# Patient Record
Sex: MALE | Race: WHITE | NOT HISPANIC OR LATINO | Employment: OTHER | URBAN - METROPOLITAN AREA
[De-identification: names, ages, dates, MRNs, and addresses within clinical notes are randomized per-mention and may not be internally consistent; named-entity substitution may affect disease eponyms.]

---

## 2017-10-20 ENCOUNTER — HOSPITAL ENCOUNTER (OUTPATIENT)
Dept: RADIOLOGY | Facility: MEDICAL CENTER | Age: 57
End: 2017-10-20

## 2017-10-20 ENCOUNTER — HOSPITAL ENCOUNTER (INPATIENT)
Facility: MEDICAL CENTER | Age: 57
LOS: 4 days | DRG: 291 | End: 2017-10-24
Attending: EMERGENCY MEDICINE | Admitting: HOSPITALIST
Payer: COMMERCIAL

## 2017-10-20 ENCOUNTER — APPOINTMENT (OUTPATIENT)
Dept: RADIOLOGY | Facility: MEDICAL CENTER | Age: 57
DRG: 291 | End: 2017-10-20
Attending: EMERGENCY MEDICINE
Payer: COMMERCIAL

## 2017-10-20 ENCOUNTER — RESOLUTE PROFESSIONAL BILLING HOSPITAL PROF FEE (OUTPATIENT)
Dept: HOSPITALIST | Facility: MEDICAL CENTER | Age: 57
End: 2017-10-20
Payer: COMMERCIAL

## 2017-10-20 DIAGNOSIS — R79.89 ELEVATED TROPONIN: ICD-10-CM

## 2017-10-20 DIAGNOSIS — R06.02 SHORTNESS OF BREATH: ICD-10-CM

## 2017-10-20 DIAGNOSIS — E87.6 HYPOKALEMIA: ICD-10-CM

## 2017-10-20 PROBLEM — I10 ESSENTIAL HYPERTENSION: Status: ACTIVE | Noted: 2017-10-20

## 2017-10-20 PROBLEM — E43 SEVERE PROTEIN-CALORIE MALNUTRITION (HCC): Status: ACTIVE | Noted: 2017-10-20

## 2017-10-20 PROBLEM — J44.1 COPD EXACERBATION (HCC): Status: ACTIVE | Noted: 2017-10-20

## 2017-10-20 PROBLEM — J96.21 ACUTE ON CHRONIC RESPIRATORY FAILURE WITH HYPOXIA (HCC): Status: ACTIVE | Noted: 2017-10-20

## 2017-10-20 PROBLEM — D64.9 NORMOCYTIC ANEMIA: Status: ACTIVE | Noted: 2017-10-20

## 2017-10-20 PROBLEM — E87.29 HIGH ANION GAP METABOLIC ACIDOSIS: Status: ACTIVE | Noted: 2017-10-20

## 2017-10-20 PROBLEM — R64 CACHEXIA (HCC): Status: ACTIVE | Noted: 2017-10-20

## 2017-10-20 LAB
ALBUMIN SERPL BCP-MCNC: 3.2 G/DL (ref 3.2–4.9)
ALBUMIN/GLOB SERPL: 0.7 G/DL
ALP SERPL-CCNC: 163 U/L (ref 30–99)
ALT SERPL-CCNC: <5 U/L (ref 2–50)
ANION GAP SERPL CALC-SCNC: 16 MMOL/L (ref 0–11.9)
AST SERPL-CCNC: 10 U/L (ref 12–45)
BASE EXCESS BLDA CALC-SCNC: -9 MMOL/L (ref -4–3)
BASOPHILS # BLD AUTO: 0.6 % (ref 0–1.8)
BASOPHILS # BLD: 0.05 K/UL (ref 0–0.12)
BILIRUB SERPL-MCNC: 0.3 MG/DL (ref 0.1–1.5)
BODY TEMPERATURE: ABNORMAL CENTIGRADE
BUN SERPL-MCNC: 11 MG/DL (ref 8–22)
CALCIUM SERPL-MCNC: 8.6 MG/DL (ref 8.5–10.5)
CHLORIDE SERPL-SCNC: 109 MMOL/L (ref 96–112)
CO2 SERPL-SCNC: 17 MMOL/L (ref 20–33)
CREAT SERPL-MCNC: 1.01 MG/DL (ref 0.5–1.4)
EKG IMPRESSION: NORMAL
EOSINOPHIL # BLD AUTO: 0.03 K/UL (ref 0–0.51)
EOSINOPHIL NFR BLD: 0.4 % (ref 0–6.9)
ERYTHROCYTE [DISTWIDTH] IN BLOOD BY AUTOMATED COUNT: 53.2 FL (ref 35.9–50)
EST. AVERAGE GLUCOSE BLD GHB EST-MCNC: 120 MG/DL
GFR SERPL CREATININE-BSD FRML MDRD: >60 ML/MIN/1.73 M 2
GLOBULIN SER CALC-MCNC: 4.4 G/DL (ref 1.9–3.5)
GLUCOSE SERPL-MCNC: 154 MG/DL (ref 65–99)
HBA1C MFR BLD: 5.8 % (ref 0–5.6)
HCO3 BLDA-SCNC: 14 MMOL/L (ref 17–25)
HCT VFR BLD AUTO: 33.9 % (ref 42–52)
HGB BLD-MCNC: 10.5 G/DL (ref 14–18)
IMM GRANULOCYTES # BLD AUTO: 0.09 K/UL (ref 0–0.11)
IMM GRANULOCYTES NFR BLD AUTO: 1.1 % (ref 0–0.9)
LACTATE BLD-SCNC: 2 MMOL/L (ref 0.5–2)
LYMPHOCYTES # BLD AUTO: 1.27 K/UL (ref 1–4.8)
LYMPHOCYTES NFR BLD: 16.2 % (ref 22–41)
MCH RBC QN AUTO: 27.6 PG (ref 27–33)
MCHC RBC AUTO-ENTMCNC: 31 G/DL (ref 33.7–35.3)
MCV RBC AUTO: 89.2 FL (ref 81.4–97.8)
MONOCYTES # BLD AUTO: 0.03 K/UL (ref 0–0.85)
MONOCYTES NFR BLD AUTO: 0.4 % (ref 0–13.4)
NEUTROPHILS # BLD AUTO: 6.38 K/UL (ref 1.82–7.42)
NEUTROPHILS NFR BLD: 81.3 % (ref 44–72)
NRBC # BLD AUTO: 0 K/UL
NRBC BLD AUTO-RTO: 0 /100 WBC
PCO2 BLDA: 23.2 MMHG (ref 26–37)
PH BLDA: 7.4 [PH] (ref 7.4–7.5)
PLATELET # BLD AUTO: 475 K/UL (ref 164–446)
PMV BLD AUTO: 10.5 FL (ref 9–12.9)
PO2 BLDA: 84.8 MMHG (ref 64–87)
POTASSIUM SERPL-SCNC: 3.3 MMOL/L (ref 3.6–5.5)
PROCALCITONIN SERPL-MCNC: 0.1 NG/ML
PROT SERPL-MCNC: 7.6 G/DL (ref 6–8.2)
RBC # BLD AUTO: 3.8 M/UL (ref 4.7–6.1)
SAO2 % BLDA: 95.6 % (ref 93–99)
SODIUM SERPL-SCNC: 142 MMOL/L (ref 135–145)
TROPONIN I SERPL-MCNC: 0.05 NG/ML (ref 0–0.04)
TROPONIN I SERPL-MCNC: 0.06 NG/ML (ref 0–0.04)
TSH SERPL DL<=0.005 MIU/L-ACNC: 0.69 UIU/ML (ref 0.3–3.7)
WBC # BLD AUTO: 7.9 K/UL (ref 4.8–10.8)

## 2017-10-20 PROCEDURE — 99285 EMERGENCY DEPT VISIT HI MDM: CPT

## 2017-10-20 PROCEDURE — 700111 HCHG RX REV CODE 636 W/ 250 OVERRIDE (IP): Performed by: HOSPITALIST

## 2017-10-20 PROCEDURE — 700101 HCHG RX REV CODE 250: Performed by: HOSPITALIST

## 2017-10-20 PROCEDURE — 36415 COLL VENOUS BLD VENIPUNCTURE: CPT

## 2017-10-20 PROCEDURE — 82803 BLOOD GASES ANY COMBINATION: CPT

## 2017-10-20 PROCEDURE — 304561 HCHG STAT O2

## 2017-10-20 PROCEDURE — 83605 ASSAY OF LACTIC ACID: CPT

## 2017-10-20 PROCEDURE — 96372 THER/PROPH/DIAG INJ SC/IM: CPT

## 2017-10-20 PROCEDURE — 99223 1ST HOSP IP/OBS HIGH 75: CPT | Performed by: HOSPITALIST

## 2017-10-20 PROCEDURE — 96365 THER/PROPH/DIAG IV INF INIT: CPT

## 2017-10-20 PROCEDURE — 700102 HCHG RX REV CODE 250 W/ 637 OVERRIDE(OP): Performed by: EMERGENCY MEDICINE

## 2017-10-20 PROCEDURE — 80053 COMPREHEN METABOLIC PANEL: CPT

## 2017-10-20 PROCEDURE — 85025 COMPLETE CBC W/AUTO DIFF WBC: CPT

## 2017-10-20 PROCEDURE — 700102 HCHG RX REV CODE 250 W/ 637 OVERRIDE(OP): Performed by: HOSPITALIST

## 2017-10-20 PROCEDURE — A9270 NON-COVERED ITEM OR SERVICE: HCPCS | Performed by: HOSPITALIST

## 2017-10-20 PROCEDURE — 94640 AIRWAY INHALATION TREATMENT: CPT

## 2017-10-20 PROCEDURE — 84145 PROCALCITONIN (PCT): CPT

## 2017-10-20 PROCEDURE — 84443 ASSAY THYROID STIM HORMONE: CPT

## 2017-10-20 PROCEDURE — 770020 HCHG ROOM/CARE - TELE (206)

## 2017-10-20 PROCEDURE — 71010 DX-CHEST-LIMITED (1 VIEW): CPT

## 2017-10-20 PROCEDURE — 84484 ASSAY OF TROPONIN QUANT: CPT

## 2017-10-20 PROCEDURE — A9270 NON-COVERED ITEM OR SERVICE: HCPCS | Performed by: EMERGENCY MEDICINE

## 2017-10-20 PROCEDURE — 83036 HEMOGLOBIN GLYCOSYLATED A1C: CPT

## 2017-10-20 PROCEDURE — 93005 ELECTROCARDIOGRAM TRACING: CPT | Performed by: HOSPITALIST

## 2017-10-20 PROCEDURE — 96375 TX/PRO/DX INJ NEW DRUG ADDON: CPT

## 2017-10-20 PROCEDURE — 93010 ELECTROCARDIOGRAM REPORT: CPT | Performed by: INTERNAL MEDICINE

## 2017-10-20 RX ORDER — OXYCODONE HYDROCHLORIDE 5 MG/1
2.5 TABLET ORAL
Status: DISCONTINUED | OUTPATIENT
Start: 2017-10-20 | End: 2017-10-24 | Stop reason: HOSPADM

## 2017-10-20 RX ORDER — AMOXICILLIN 250 MG
2 CAPSULE ORAL 2 TIMES DAILY
Status: DISCONTINUED | OUTPATIENT
Start: 2017-10-20 | End: 2017-10-24 | Stop reason: HOSPADM

## 2017-10-20 RX ORDER — POTASSIUM CHLORIDE 7.45 MG/ML
10 INJECTION INTRAVENOUS ONCE
Status: COMPLETED | OUTPATIENT
Start: 2017-10-20 | End: 2017-10-20

## 2017-10-20 RX ORDER — CARVEDILOL 12.5 MG/1
12.5 TABLET ORAL 2 TIMES DAILY WITH MEALS
COMMUNITY

## 2017-10-20 RX ORDER — BISACODYL 10 MG
10 SUPPOSITORY, RECTAL RECTAL
Status: DISCONTINUED | OUTPATIENT
Start: 2017-10-20 | End: 2017-10-24 | Stop reason: HOSPADM

## 2017-10-20 RX ORDER — POLYETHYLENE GLYCOL 3350 17 G/17G
1 POWDER, FOR SOLUTION ORAL
Status: DISCONTINUED | OUTPATIENT
Start: 2017-10-20 | End: 2017-10-24 | Stop reason: HOSPADM

## 2017-10-20 RX ORDER — LISINOPRIL 20 MG/1
20 TABLET ORAL DAILY
COMMUNITY
End: 2017-10-20

## 2017-10-20 RX ORDER — MORPHINE SULFATE 4 MG/ML
2 INJECTION, SOLUTION INTRAMUSCULAR; INTRAVENOUS
Status: DISCONTINUED | OUTPATIENT
Start: 2017-10-20 | End: 2017-10-24 | Stop reason: HOSPADM

## 2017-10-20 RX ORDER — ASPIRIN 325 MG
325 TABLET ORAL ONCE
Status: COMPLETED | OUTPATIENT
Start: 2017-10-20 | End: 2017-10-20

## 2017-10-20 RX ORDER — MIRTAZAPINE 30 MG/1
30 TABLET, FILM COATED ORAL
Status: DISCONTINUED | OUTPATIENT
Start: 2017-10-20 | End: 2017-10-24 | Stop reason: HOSPADM

## 2017-10-20 RX ORDER — ONDANSETRON 4 MG/1
4 TABLET, ORALLY DISINTEGRATING ORAL EVERY 4 HOURS PRN
Status: DISCONTINUED | OUTPATIENT
Start: 2017-10-20 | End: 2017-10-24 | Stop reason: HOSPADM

## 2017-10-20 RX ORDER — CLONIDINE HYDROCHLORIDE 0.1 MG/1
0.1 TABLET ORAL EVERY 6 HOURS PRN
Status: DISCONTINUED | OUTPATIENT
Start: 2017-10-20 | End: 2017-10-24 | Stop reason: HOSPADM

## 2017-10-20 RX ORDER — METHYLPREDNISOLONE SODIUM SUCCINATE 40 MG/ML
40 INJECTION, POWDER, LYOPHILIZED, FOR SOLUTION INTRAMUSCULAR; INTRAVENOUS EVERY 6 HOURS
Status: COMPLETED | OUTPATIENT
Start: 2017-10-20 | End: 2017-10-23

## 2017-10-20 RX ORDER — PROMETHAZINE HYDROCHLORIDE 25 MG/1
12.5-25 SUPPOSITORY RECTAL EVERY 4 HOURS PRN
Status: DISCONTINUED | OUTPATIENT
Start: 2017-10-20 | End: 2017-10-24 | Stop reason: HOSPADM

## 2017-10-20 RX ORDER — ENALAPRILAT 1.25 MG/ML
1.25 INJECTION INTRAVENOUS EVERY 6 HOURS PRN
Status: DISCONTINUED | OUTPATIENT
Start: 2017-10-20 | End: 2017-10-24 | Stop reason: HOSPADM

## 2017-10-20 RX ORDER — DOXYCYCLINE 100 MG/1
100 TABLET ORAL EVERY 12 HOURS
Status: DISCONTINUED | OUTPATIENT
Start: 2017-10-20 | End: 2017-10-24 | Stop reason: HOSPADM

## 2017-10-20 RX ORDER — LISINOPRIL 10 MG/1
20 TABLET ORAL DAILY
COMMUNITY

## 2017-10-20 RX ORDER — ONDANSETRON 2 MG/ML
4 INJECTION INTRAMUSCULAR; INTRAVENOUS EVERY 4 HOURS PRN
Status: DISCONTINUED | OUTPATIENT
Start: 2017-10-20 | End: 2017-10-24 | Stop reason: HOSPADM

## 2017-10-20 RX ORDER — LISINOPRIL 20 MG/1
20 TABLET ORAL DAILY
Status: DISCONTINUED | OUTPATIENT
Start: 2017-10-20 | End: 2017-10-23

## 2017-10-20 RX ORDER — ACETAMINOPHEN 325 MG/1
650 TABLET ORAL EVERY 6 HOURS PRN
Status: DISCONTINUED | OUTPATIENT
Start: 2017-10-20 | End: 2017-10-24 | Stop reason: HOSPADM

## 2017-10-20 RX ORDER — PROMETHAZINE HYDROCHLORIDE 25 MG/1
12.5-25 TABLET ORAL EVERY 4 HOURS PRN
Status: DISCONTINUED | OUTPATIENT
Start: 2017-10-20 | End: 2017-10-24 | Stop reason: HOSPADM

## 2017-10-20 RX ORDER — SODIUM CHLORIDE 9 MG/ML
INJECTION, SOLUTION INTRAVENOUS CONTINUOUS
Status: DISCONTINUED | OUTPATIENT
Start: 2017-10-20 | End: 2017-10-20

## 2017-10-20 RX ORDER — CARVEDILOL 12.5 MG/1
12.5 TABLET ORAL 2 TIMES DAILY WITH MEALS
Status: DISCONTINUED | OUTPATIENT
Start: 2017-10-20 | End: 2017-10-24 | Stop reason: HOSPADM

## 2017-10-20 RX ORDER — OXYCODONE HYDROCHLORIDE 5 MG/1
5 TABLET ORAL
Status: DISCONTINUED | OUTPATIENT
Start: 2017-10-20 | End: 2017-10-24 | Stop reason: HOSPADM

## 2017-10-20 RX ORDER — MIRTAZAPINE 30 MG/1
30 TABLET, FILM COATED ORAL
COMMUNITY

## 2017-10-20 RX ORDER — IPRATROPIUM BROMIDE AND ALBUTEROL SULFATE 2.5; .5 MG/3ML; MG/3ML
3 SOLUTION RESPIRATORY (INHALATION)
Status: DISCONTINUED | OUTPATIENT
Start: 2017-10-20 | End: 2017-10-21

## 2017-10-20 RX ADMIN — METHYLPREDNISOLONE SODIUM SUCCINATE 40 MG: 40 INJECTION, POWDER, FOR SOLUTION INTRAMUSCULAR; INTRAVENOUS at 23:19

## 2017-10-20 RX ADMIN — MIRTAZAPINE 30 MG: 30 TABLET, FILM COATED ORAL at 20:30

## 2017-10-20 RX ADMIN — IPRATROPIUM BROMIDE AND ALBUTEROL SULFATE 3 ML: .5; 3 SOLUTION RESPIRATORY (INHALATION) at 20:05

## 2017-10-20 RX ADMIN — METHYLPREDNISOLONE SODIUM SUCCINATE 40 MG: 40 INJECTION, POWDER, FOR SOLUTION INTRAMUSCULAR; INTRAVENOUS at 15:16

## 2017-10-20 RX ADMIN — CARVEDILOL 12.5 MG: 12.5 TABLET, FILM COATED ORAL at 16:51

## 2017-10-20 RX ADMIN — ASPIRIN 325 MG: 325 TABLET, COATED ORAL at 11:16

## 2017-10-20 RX ADMIN — POTASSIUM CHLORIDE 10 MEQ: 10 INJECTION, SOLUTION INTRAVENOUS at 13:22

## 2017-10-20 RX ADMIN — LISINOPRIL 20 MG: 20 TABLET ORAL at 15:16

## 2017-10-20 RX ADMIN — DOXYCYCLINE 100 MG: 100 TABLET ORAL at 15:16

## 2017-10-20 RX ADMIN — ENOXAPARIN SODIUM 40 MG: 100 INJECTION SUBCUTANEOUS at 16:51

## 2017-10-20 RX ADMIN — IPRATROPIUM BROMIDE AND ALBUTEROL SULFATE 3 ML: .5; 3 SOLUTION RESPIRATORY (INHALATION) at 15:20

## 2017-10-20 ASSESSMENT — PATIENT HEALTH QUESTIONNAIRE - PHQ9
SUM OF ALL RESPONSES TO PHQ QUESTIONS 1-9: 0
SUM OF ALL RESPONSES TO PHQ9 QUESTIONS 1 AND 2: 0
1. LITTLE INTEREST OR PLEASURE IN DOING THINGS: NOT AT ALL
2. FEELING DOWN, DEPRESSED, IRRITABLE, OR HOPELESS: NOT AT ALL

## 2017-10-20 ASSESSMENT — ENCOUNTER SYMPTOMS
BLURRED VISION: 0
SHORTNESS OF BREATH: 0
NEUROLOGICAL NEGATIVE: 1
NAUSEA: 0
MUSCULOSKELETAL NEGATIVE: 1
PSYCHIATRIC NEGATIVE: 1
ABDOMINAL PAIN: 0
DIARRHEA: 0
SPUTUM PRODUCTION: 0
SHORTNESS OF BREATH: 1
DOUBLE VISION: 0
PALPITATIONS: 0
COUGH: 0
HEADACHES: 0
CONSTIPATION: 0
CHILLS: 0
VOMITING: 0
FEVER: 0

## 2017-10-20 ASSESSMENT — PAIN SCALES - GENERAL
PAINLEVEL_OUTOF10: 0

## 2017-10-20 ASSESSMENT — LIFESTYLE VARIABLES
EVER_SMOKED: YES
DO YOU DRINK ALCOHOL: NO

## 2017-10-20 ASSESSMENT — COPD QUESTIONNAIRES
DO YOU EVER COUGH UP ANY MUCUS OR PHLEGM?: NO/ONLY WITH OCCASIONAL COLDS OR INFECTIONS
COPD SCREENING SCORE: 4
DURING THE PAST 4 WEEKS HOW MUCH DID YOU FEEL SHORT OF BREATH: SOME OF THE TIME
HAVE YOU SMOKED AT LEAST 100 CIGARETTES IN YOUR ENTIRE LIFE: YES

## 2017-10-20 NOTE — H&P
Hospital Medicine History and Physical    Date of Service  10/20/2017    Chief Complaint  Chief Complaint   Patient presents with   • Shortness of Breath     Transfer from [place] for early PNA and possible sepsis       History of Presenting Illness  57 y.o. Male With history of essential hypertension, and recent history other this year severe pneumonia, requiring home oxygen therapy was in his usual state of health until approximately one month prior to this admission. He reports at that time, he was told to stop his home oxygen therapy as well as his nebulized therapy, and that he was improving. Approximately 2 weeks prior to admission, he began to feel shortness of breath, associated with weakness. He reports being unable to walk is normal distance, being able to only walk about 10 minutes without becoming weak. He reports that all of these symptoms increased in intensity on the night prior to this admission, at which point he presented to the emergency department in [place]. At that time, initial lab surgery on and he was placed on nebulizer therapy. He did have an elevated lactic acid level, and was treated for sepsis, and subsequently transferred to this facility for further evaluation. He currently states that he is feeling better. He has no significant headache, he does have near blindness in both eyes which is his normal, he denies any chest pain, he still feels a little short of breath, no abdominal pain, nausea or vomiting, diarrhea or constipation. He does not state any fever or chills.       Primary Care Physician  YOLI Gibson M.D.    Consultants  None    Code Status  Full code    Review of Systems  Review of Systems   Constitutional: Negative for chills and fever.   Eyes: Negative for blurred vision and double vision.        Baseline vision loss, no acute changes noted   Respiratory: Negative for cough, sputum production and shortness of breath.    Cardiovascular: Negative for chest pain  and palpitations.   Gastrointestinal: Negative for abdominal pain, constipation, diarrhea, nausea and vomiting.   Genitourinary: Negative for dysuria.   Musculoskeletal: Negative.    Skin: Negative.    Neurological: Negative.  Negative for headaches.   Endo/Heme/Allergies: Negative.    Psychiatric/Behavioral: Negative.         Past Medical History  Past Medical History:   Diagnosis Date   • Alcohol abuse     Pt no longer drinking   • Anxiety    • Chronic obstructive pulmonary disease (CMS-HCC)    • Dental caries    • Hypertension        Surgical History  No past surgical history on file.    Medications  No current facility-administered medications on file prior to encounter.      No current outpatient prescriptions on file prior to encounter.       Family History  History reviewed. No pertinent family history.    Social History  Social History   Substance Use Topics   • Smoking status: Former Smoker     Packs/day: 2.00     Types: Cigarettes     Quit date: 2017   • Smokeless tobacco: Never Used   • Alcohol use No      Comment: Pt quit 2017, former heavy drinker       Allergies  No Known Allergies     Physical Exam  Laboratory   Hemodynamics  Temp (24hrs), Av.8 °C (98.2 °F), Min:36.8 °C (98.2 °F), Max:36.8 °C (98.2 °F)   Temperature: 36.8 °C (98.2 °F)  Pulse  Av.5  Min: 95  Max: 98 Heart Rate (Monitored): 96  Blood Pressure: (!) 175/97, NIBP: 160/84      Respiratory      Respiration: 18, Pulse Oximetry: 96 %             Physical Exam   Constitutional: He is oriented to person, place, and time. He appears well-developed. No distress.   Appears cachectic, notably has thenar wasting   HENT:   Head: Normocephalic.   Eyes: Conjunctivae and EOM are normal. Pupils are equal, round, and reactive to light.   Neck: Normal range of motion. Neck supple. No tracheal deviation present. No thyromegaly present.   Cardiovascular: Normal rate, regular rhythm and normal heart sounds.  Exam reveals no gallop and no  friction rub.    No murmur heard.  Pulmonary/Chest: Breath sounds normal. He is in respiratory distress. He has no wheezes. He has no rales.   Abdominal: Soft. Bowel sounds are normal. He exhibits no distension and no mass. There is no tenderness. There is no rebound and no guarding.   Musculoskeletal: Normal range of motion. He exhibits no edema.   Lymphadenopathy:     He has no cervical adenopathy.   Neurological: He is alert and oriented to person, place, and time. No cranial nerve deficit.   Skin: Skin is warm and dry. He is not diaphoretic.   Psychiatric: He has a normal mood and affect.   Nursing note and vitals reviewed.      Recent Labs      10/20/17   0930   WBC  7.9   RBC  3.80*   HEMOGLOBIN  10.5*   HEMATOCRIT  33.9*   MCV  89.2   MCH  27.6   MCHC  31.0*   RDW  53.2*   PLATELETCT  475*   MPV  10.5     Recent Labs      10/20/17   0930   SODIUM  142   POTASSIUM  3.3*   CHLORIDE  109   CO2  17*   GLUCOSE  154*   BUN  11   CREATININE  1.01   CALCIUM  8.6     Recent Labs      10/20/17   0930   ALTSGPT  <5   ASTSGOT  10*   ALKPHOSPHAT  163*   TBILIRUBIN  0.3   GLUCOSE  154*                 Lab Results   Component Value Date    TROPONINI 0.06 (H) 10/20/2017     Urinalysis:  No results found for: SPECGRAVITY, GLUCOSEUR, KETONES, NITRITE, WBCURINE, RBCURINE, BACTERIA, EPITHELCELL     Imaging  Chest radiograph with left lung base possible infiltrate        Assessment/Plan     I anticipate this patient will require at least two midnights for appropriate medical management, necessitating inpatient admission.    * COPD exacerbation (CMS-HCC)   Assessment & Plan    Start nebulizer therapy, steroids, and doxycycline oral.  Monitor closely.  Will likely need home oxygen re-evaluation, as he just discontinued his home oxygen therapy in the last month.          Acute on chronic respiratory failure with hypoxia (CMS-HCC)   Assessment & Plan    Continue with oxygen therapy as needed.          Troponin I above reference range    Assessment & Plan    Likely demand from hypoxia/tachycardia.  Will trend.  No current chest pain reported.          High anion gap metabolic acidosis   Assessment & Plan    ?starvation ketosis, as patient appears cachectic.  Will check urinalysis.         Normocytic anemia   Assessment & Plan    Currently stable, however will monitor.  Patient reports taking 12-15 tablets of ibuprofen daily for last several days.  He is unaware of any black or tarry stools as he has a vision deficit.  Given hypoxia, concern for possible cause.  Transfuse if needed for hemoglobin less than 7 gm/dL          Severe protein-calorie malnutrition (CMS-HCC)   Assessment & Plan    Encourage oral intake.  Appreciate dietary consultation        Cachexia (CMS-HCC)   Assessment & Plan    Likely due to chronic disease.          Essential hypertension   Assessment & Plan    Variable control - continue current medication regimen, PRN medications             VTE prophylaxis: Lovenox Lovenox and sequential compression devices .

## 2017-10-20 NOTE — ED NOTES
Med rec complete per patient and [facility]  Allergies reviewed  No ORAL antibiotics in last 30 days    Patient was on Lisinopril 10 mg and was just increased to 20 mg on 10-16-17

## 2017-10-20 NOTE — ED NOTES
Pt updated of POC, pt resting comfortably in cart, pt has no complaints at this time. Awaiting clean bed at this time

## 2017-10-20 NOTE — ED PROVIDER NOTES
ED Provider Note    Scribed for Marlyn Calderon D.O. by Vinita Munguia. 10/20/2017, 9:03 AM.    Primary care provider: No primary care provider noted.  Means of arrival: EMS  History obtained from: Patient   History limited by: none    CHIEF COMPLAINT  Chief Complaint   Patient presents with   • Shortness of Breath     Transfer from [place] for early PNA and possible sepsis       HPI  Scott Matthews is a 57 y.o. male who presents to the Emergency Department by EMS as a transfer from [facility] for early pneumonia and possible sepsis. Patient has previously been on oxygen at home but no longer needs it, for the last month. A few days ago Patient presented to primary care office for follow-up with back pain and was told he had a hairline fracture.  His primary care doctor suspected patient had an infection but the source was unclear and may be caused by poor dentition. Patient states he has poor dentition but he does not have dental pain.  He reports that he presented to [place] ED last night with shortness of breath. Patient felt warm last night but did not take his temperature.  Patient was told in the ED last night that he likely has COPD. He is a current smoker and states he has been smoking for a long time. Patient also is a drinker. Patient's workup in [place], prompted transfer here to this facility.    REVIEW OF SYSTEMS  Review of Systems   Respiratory: Positive for shortness of breath.    Cardiovascular: Negative for chest pain.   Gastrointestinal: Negative for abdominal pain, nausea and vomiting.   Genitourinary: Negative for dysuria.   Neurological: Negative for headaches.   All other systems reviewed and are negative.  C.    PAST MEDICAL HISTORY   has a past medical history of Alcohol abuse; Anxiety; Chronic obstructive pulmonary disease (CMS-HCC); Dental caries; and Hypertension.    SURGICAL HISTORY  patient denies any surgical history    SOCIAL HISTORY  Social History  "  Substance Use Topics   • Smoking status: Former Smoker     Packs/day: 2.00     Types: Cigarettes     Quit date: 1/29/2017   • Smokeless tobacco: Never Used   • Alcohol use No      Comment: Pt quit 1/29/2017, former heavy drinker      History   Drug Use No       FAMILY HISTORY  No family history noted    CURRENT MEDICATIONS  No current facility-administered medications on file prior to encounter.      No current outpatient prescriptions on file prior to encounter.       ALLERGIES  No Known Allergies    PHYSICAL EXAM  VITAL SIGNS: BP (!) 175/97   Pulse 98   Temp 36.8 °C (98.2 °F)   Resp 18   Ht 1.727 m (5' 8\")   Wt 54.5 kg (120 lb 4.2 oz)   SpO2 96%   BMI 18.29 kg/m²   Vitals reviewed.  Constitutional: Patient is oriented to person, place, and time. Appears well-developed and well-nourished. No distress.    Head: Normocephalic and atraumatic.   Ears: Normal external ears bilaterally.   Mouth/Throat: Oropharynx is clear and moist  Eyes: Conjunctivae are normal. Pupils are equal, round, and reactive to light.   Neck: Normal range of motion. Neck supple.  Cardiovascular: Tachycardic rate, regular rhythm and normal heart sounds. Normal peripheral pulses.  Pulmonary/Chest: Effort normal and diminished breath sounds. No respiratory distress, no wheezes, rhonchi, or rales. No chest wall tenderness.   Abdominal: Soft. Bowel sounds are normal. There is no tenderness, rebound or guarding, or peritoneal signs  Musculoskeletal: No edema and no tenderness.   Neurological: No focal deficits.   Skin: Skin is warm and dry. No erythema. No pallor.   Psychiatric: Patient has a normal mood and affect.     LABS  Results for orders placed or performed during the hospital encounter of 10/20/17   TROPONIN   Result Value Ref Range    Troponin I 0.06 (H) 0.00 - 0.04 ng/mL   CBC WITH DIFFERENTIAL   Result Value Ref Range    WBC 7.9 4.8 - 10.8 K/uL    RBC 3.80 (L) 4.70 - 6.10 M/uL    Hemoglobin 10.5 (L) 14.0 - 18.0 g/dL    Hematocrit " 33.9 (L) 42.0 - 52.0 %    MCV 89.2 81.4 - 97.8 fL    MCH 27.6 27.0 - 33.0 pg    MCHC 31.0 (L) 33.7 - 35.3 g/dL    RDW 53.2 (H) 35.9 - 50.0 fL    Platelet Count 475 (H) 164 - 446 K/uL    MPV 10.5 9.0 - 12.9 fL    Neutrophils-Polys 81.30 (H) 44.00 - 72.00 %    Lymphocytes 16.20 (L) 22.00 - 41.00 %    Monocytes 0.40 0.00 - 13.40 %    Eosinophils 0.40 0.00 - 6.90 %    Basophils 0.60 0.00 - 1.80 %    Immature Granulocytes 1.10 (H) 0.00 - 0.90 %    Nucleated RBC 0.00 /100 WBC    Neutrophils (Absolute) 6.38 1.82 - 7.42 K/uL    Lymphs (Absolute) 1.27 1.00 - 4.80 K/uL    Monos (Absolute) 0.03 0.00 - 0.85 K/uL    Eos (Absolute) 0.03 0.00 - 0.51 K/uL    Baso (Absolute) 0.05 0.00 - 0.12 K/uL    Immature Granulocytes (abs) 0.09 0.00 - 0.11 K/uL    NRBC (Absolute) 0.00 K/uL   CMP   Result Value Ref Range    Sodium 142 135 - 145 mmol/L    Potassium 3.3 (L) 3.6 - 5.5 mmol/L    Chloride 109 96 - 112 mmol/L    Co2 17 (L) 20 - 33 mmol/L    Anion Gap 16.0 (H) 0.0 - 11.9    Glucose 154 (H) 65 - 99 mg/dL    Bun 11 8 - 22 mg/dL    Creatinine 1.01 0.50 - 1.40 mg/dL    Calcium 8.6 8.5 - 10.5 mg/dL    AST(SGOT) 10 (L) 12 - 45 U/L    ALT(SGPT) <5 2 - 50 U/L    Alkaline Phosphatase 163 (H) 30 - 99 U/L    Total Bilirubin 0.3 0.1 - 1.5 mg/dL    Albumin 3.2 3.2 - 4.9 g/dL    Total Protein 7.6 6.0 - 8.2 g/dL    Globulin 4.4 (H) 1.9 - 3.5 g/dL    A-G Ratio 0.7 g/dL   LACTIC ACID   Result Value Ref Range    Lactic Acid 2.0 0.5 - 2.0 mmol/L   ESTIMATED GFR   Result Value Ref Range    GFR If African American >60 >60 mL/min/1.73 m 2    GFR If Non African American >60 >60 mL/min/1.73 m 2     All labs reviewed by me.    EKG Interpretation  Interpreted by me    Rhythm: normal sinus   Rate: normal  Axis: normal  Ectopy: none  Conduction: normal  ST Segments: no acute change  T Waves: no acute change  Q Waves: none    Clinical Impression: no acute changes and normal EKG    RADIOLOGY  DX-CHEST-LIMITED (1 VIEW)   Final Result         Ill-defined opacity in  the left lung apex could relate to infection.      OUTSIDE IMAGES-NUCLEAR MEDICINE   Final Result      OUTSIDE IMAGES-DX CHEST   Final Result      [id number] FILM X-RAY   Final Result      OUTSIDE IMAGES-DX CHEST   Final Result        The radiologist's interpretation of all radiological studies have been reviewed by me.    COURSE & MEDICAL DECISION MAKING  Pertinent Labs & Imaging studies reviewed. (See chart for details)    Obtained and reviewed past medical records from [facility] which indicate patient's Lactate was 3.2 that went down to 1.2 given Levaquin, hypoxic between 82 and 88%. WBC was 19, Potassium was 3.2, platelet count is 546.     9:03 AM - Patient seen and examined at bedside.  Ordered CMP, Lactic acid, Troponin, CBC with differential, DX-chest to evaluate his symptoms. The differential diagnoses include but are not limited to: sepsis, pneumonia, SIRS, COPDExacerbation.       10:43 AM Recheck: Patient re-evaluated at beside. Discussed patient's condition and treatment plan including admission. We discussed labs which show a normalized white blood cell count. His lactate is normal. His potassium is improved but still low. Patient's lab results discussed which indicate an elevated Troponin. At this time, if looking less like infectious etiology. The suspicious finding in his ease of his lung, is apparently chronic which she has reevaluated yearly. No fever. Most of the abnormalities, that were noted prior to transfer, have normalized. However, concern for heart strain with regard to the elevated troponin. The patient understood and is in agreement for admission.     11:06 AM - I discussed the patient's case and the above findings with the hospitalist who agrees to admit the patient to their service.    DISPOSITION:  Patient will be admitted to stable but guarded condition.        FINAL IMPRESSION  1. Shortness of breath    2. Elevated troponin    3. Hypokalemia          Vinita CASTANEDA  (Scribe), am scribing for, and in the presence of, Marlyn Calderon D.O..    Electronically signed by: Vinita Munguia (Scribe), 10/20/2017    I, Marlyn Calderon D.O. personally performed the services described in this documentation, as scribed by Vinita Munguia in my presence, and it is both accurate and complete.    The note accurately reflects work and decisions made by me.  Marlyn Calderon  10/20/2017  11:26 AM

## 2017-10-20 NOTE — FLOWSHEET NOTE
10/20/17 1521   Events/Summary/Plan   Events/Summary/Plan SVN given   Interdisciplinary Plan of Care-Goals (Indications)   Obstructive Ventilatory Defect or Pulmonary Disease without Obvious Obstruction History / Diagnosis   Interdisciplinary Plan of Care-Outcomes    Bronchodilator Outcome Patient at Stable Baseline   Education   Education Yes - Pt. / Family has been Instructed in use of Respiratory Equipment;Yes - Pt. / Family has been Instructed in use of Respiratory Medications and Adverse Reactions   RT Assessment of Delivered Medications   Evaluation of Medication Delivery Daily Yes-- Pt /Family has been Instructed in use of Respiratory Medications and Adverse Reactions   SVN Group   #SVN Performed Yes   Given By: Mouthpiece   Date SVN Last Changed 10/20/17   Date SVN Next Change Due (Q 7 Days) 10/27/17   Respiratory WDL   Respiratory (WDL) X   Chest Exam   Respiration 14   Pulse 94   Heart Rate (Monitored) 95   Breath Sounds   Pre/Post Intervention Pre Intervention Assessment   RUL Breath Sounds Clear   RML Breath Sounds Diminished   RLL Breath Sounds Diminished   CINDY Breath Sounds Clear   LLL Breath Sounds Diminished   Oximetry   Continuous Oximetry Yes   Oxygen   Pulse Oximetry 93 %   O2 (LPM) 1   O2 Daily Delivery Respiratory  Silicone Nasal Cannula

## 2017-10-20 NOTE — ED NOTES
Chief Complaint   Patient presents with   • Shortness of Breath     Transfer from [place] for early PNA and possible sepsis     Chart up for ERP.

## 2017-10-20 NOTE — ASSESSMENT & PLAN NOTE
Currently stable, however will monitor.  Patient reports taking 12-15 tablets of ibuprofen daily for last several days.  He is unaware of any black or tarry stools as he has a vision deficit.  Given hypoxia, concern for possible cause.  Transfuse if needed for hemoglobin less than 7 gm/dL  F/U WITH PCP AS AN OUTPT FOR GI REFERAL

## 2017-10-20 NOTE — ASSESSMENT & PLAN NOTE
HAD ANOTHER EPISODE OF RAPID RESPONSE EARLY THIS AM AS PER NURSE'S REPORT AND MOST EFRAIN 2ND TO  ANXIETY  ACUTE ON CHRONIC WITH ACUTE RESP FAILURE  SOLUMEDROL  RESP PROTOCOL  DOXYCYCLINE  WILL CONTINUE OBSERVING  MOST EFRAIN NEEDS O2 AT HOME   CHEST XRAY RESULT IS NOTED AND NO SIGN OF PNEUMONIA

## 2017-10-20 NOTE — ASSESSMENT & PLAN NOTE
NO CHEST PAIN  EKG IS REVIEWED WITH NONE SPECIFIC ST CHANGES  TROPS MILDLY ELEVATED  APPEARS TO BE 2ND TO DEMAND ISCHEMIA WITH TACHYCARDIA

## 2017-10-21 ENCOUNTER — APPOINTMENT (OUTPATIENT)
Dept: RADIOLOGY | Facility: MEDICAL CENTER | Age: 57
DRG: 291 | End: 2017-10-21
Attending: HOSPITALIST
Payer: COMMERCIAL

## 2017-10-21 ENCOUNTER — APPOINTMENT (OUTPATIENT)
Dept: RADIOLOGY | Facility: MEDICAL CENTER | Age: 57
DRG: 291 | End: 2017-10-21
Attending: FAMILY MEDICINE
Payer: COMMERCIAL

## 2017-10-21 LAB
ALBUMIN SERPL BCP-MCNC: 3 G/DL (ref 3.2–4.9)
ALBUMIN/GLOB SERPL: 0.8 G/DL
ALP SERPL-CCNC: 133 U/L (ref 30–99)
ALT SERPL-CCNC: <5 U/L (ref 2–50)
ANION GAP SERPL CALC-SCNC: 13 MMOL/L (ref 0–11.9)
APPEARANCE UR: CLEAR
AST SERPL-CCNC: 10 U/L (ref 12–45)
BACTERIA #/AREA URNS HPF: NEGATIVE /HPF
BASE EXCESS BLDA CALC-SCNC: -9 MMOL/L (ref -4–3)
BASOPHILS # BLD AUTO: 0.1 % (ref 0–1.8)
BASOPHILS # BLD AUTO: 0.3 % (ref 0–1.8)
BASOPHILS # BLD: 0.01 K/UL (ref 0–0.12)
BASOPHILS # BLD: 0.02 K/UL (ref 0–0.12)
BILIRUB SERPL-MCNC: 0.2 MG/DL (ref 0.1–1.5)
BILIRUB UR QL STRIP.AUTO: NEGATIVE
BODY TEMPERATURE: ABNORMAL DEGREES
BUN SERPL-MCNC: 18 MG/DL (ref 8–22)
CALCIUM SERPL-MCNC: 8.4 MG/DL (ref 8.5–10.5)
CHLORIDE SERPL-SCNC: 108 MMOL/L (ref 96–112)
CHOLEST SERPL-MCNC: 116 MG/DL (ref 100–199)
CO2 BLDA-SCNC: 19 MMOL/L (ref 20–33)
CO2 SERPL-SCNC: 18 MMOL/L (ref 20–33)
COLOR UR: YELLOW
CREAT SERPL-MCNC: 1.09 MG/DL (ref 0.5–1.4)
EOSINOPHIL # BLD AUTO: 0 K/UL (ref 0–0.51)
EOSINOPHIL # BLD AUTO: 0.02 K/UL (ref 0–0.51)
EOSINOPHIL NFR BLD: 0 % (ref 0–6.9)
EOSINOPHIL NFR BLD: 0.3 % (ref 0–6.9)
EPI CELLS #/AREA URNS HPF: ABNORMAL /HPF
ERYTHROCYTE [DISTWIDTH] IN BLOOD BY AUTOMATED COUNT: 52.3 FL (ref 35.9–50)
ERYTHROCYTE [DISTWIDTH] IN BLOOD BY AUTOMATED COUNT: 55.2 FL (ref 35.9–50)
GFR SERPL CREATININE-BSD FRML MDRD: >60 ML/MIN/1.73 M 2
GLOBULIN SER CALC-MCNC: 3.9 G/DL (ref 1.9–3.5)
GLUCOSE BLD-MCNC: 287 MG/DL (ref 65–99)
GLUCOSE BLD-MCNC: 385 MG/DL (ref 65–99)
GLUCOSE SERPL-MCNC: 141 MG/DL (ref 65–99)
GLUCOSE UR STRIP.AUTO-MCNC: 100 MG/DL
HCO3 BLDA-SCNC: 17.5 MMOL/L (ref 17–25)
HCT VFR BLD AUTO: 29.1 % (ref 42–52)
HCT VFR BLD AUTO: 30.3 % (ref 42–52)
HDLC SERPL-MCNC: 32 MG/DL
HGB BLD-MCNC: 9.2 G/DL (ref 14–18)
HGB BLD-MCNC: 9.3 G/DL (ref 14–18)
HYALINE CASTS #/AREA URNS LPF: ABNORMAL /LPF
IMM GRANULOCYTES # BLD AUTO: 0.05 K/UL (ref 0–0.11)
IMM GRANULOCYTES # BLD AUTO: 0.06 K/UL (ref 0–0.11)
IMM GRANULOCYTES NFR BLD AUTO: 0.6 % (ref 0–0.9)
IMM GRANULOCYTES NFR BLD AUTO: 0.6 % (ref 0–0.9)
KETONES UR STRIP.AUTO-MCNC: 15 MG/DL
LDLC SERPL CALC-MCNC: 61 MG/DL
LEUKOCYTE ESTERASE UR QL STRIP.AUTO: NEGATIVE
LYMPHOCYTES # BLD AUTO: 1.47 K/UL (ref 1–4.8)
LYMPHOCYTES # BLD AUTO: 1.97 K/UL (ref 1–4.8)
LYMPHOCYTES NFR BLD: 18.4 % (ref 22–41)
LYMPHOCYTES NFR BLD: 20 % (ref 22–41)
MCH RBC QN AUTO: 27.4 PG (ref 27–33)
MCH RBC QN AUTO: 27.7 PG (ref 27–33)
MCHC RBC AUTO-ENTMCNC: 30.7 G/DL (ref 33.7–35.3)
MCHC RBC AUTO-ENTMCNC: 31.6 G/DL (ref 33.7–35.3)
MCV RBC AUTO: 87.7 FL (ref 81.4–97.8)
MCV RBC AUTO: 89.4 FL (ref 81.4–97.8)
MICRO URNS: ABNORMAL
MONOCYTES # BLD AUTO: 0.18 K/UL (ref 0–0.85)
MONOCYTES # BLD AUTO: 0.23 K/UL (ref 0–0.85)
MONOCYTES NFR BLD AUTO: 2.3 % (ref 0–13.4)
MONOCYTES NFR BLD AUTO: 2.3 % (ref 0–13.4)
NEUTROPHILS # BLD AUTO: 6.24 K/UL (ref 1.82–7.42)
NEUTROPHILS # BLD AUTO: 7.57 K/UL (ref 1.82–7.42)
NEUTROPHILS NFR BLD: 77 % (ref 44–72)
NEUTROPHILS NFR BLD: 78.1 % (ref 44–72)
NITRITE UR QL STRIP.AUTO: NEGATIVE
NRBC # BLD AUTO: 0 K/UL
NRBC # BLD AUTO: 0 K/UL
NRBC BLD AUTO-RTO: 0 /100 WBC
NRBC BLD AUTO-RTO: 0 /100 WBC
O2/TOTAL GAS SETTING VFR VENT: 4 %
PCO2 BLDA: 41.8 MMHG (ref 26–37)
PCO2 TEMP ADJ BLDA: 41.8 MMHG (ref 26–37)
PH BLDA: 7.23 [PH] (ref 7.4–7.5)
PH TEMP ADJ BLDA: 7.23 [PH] (ref 7.4–7.5)
PH UR STRIP.AUTO: 6 [PH]
PLATELET # BLD AUTO: 441 K/UL (ref 164–446)
PLATELET # BLD AUTO: 572 K/UL (ref 164–446)
PMV BLD AUTO: 10.8 FL (ref 9–12.9)
PMV BLD AUTO: 11.3 FL (ref 9–12.9)
PO2 BLDA: 76 MMHG (ref 64–87)
PO2 TEMP ADJ BLDA: 76 MMHG (ref 64–87)
POTASSIUM SERPL-SCNC: 3.4 MMOL/L (ref 3.6–5.5)
PROT SERPL-MCNC: 6.9 G/DL (ref 6–8.2)
PROT UR QL STRIP: 30 MG/DL
RBC # BLD AUTO: 3.32 M/UL (ref 4.7–6.1)
RBC # BLD AUTO: 3.39 M/UL (ref 4.7–6.1)
RBC # URNS HPF: ABNORMAL /HPF
RBC UR QL AUTO: NEGATIVE
SAO2 % BLDA: 92 % (ref 93–99)
SODIUM SERPL-SCNC: 139 MMOL/L (ref 135–145)
SP GR UR STRIP.AUTO: 1.02
SPECIMEN DRAWN FROM PATIENT: ABNORMAL
TRIGL SERPL-MCNC: 115 MG/DL (ref 0–149)
UROBILINOGEN UR STRIP.AUTO-MCNC: 0.2 MG/DL
WBC # BLD AUTO: 8 K/UL (ref 4.8–10.8)
WBC # BLD AUTO: 9.8 K/UL (ref 4.8–10.8)
WBC #/AREA URNS HPF: ABNORMAL /HPF

## 2017-10-21 PROCEDURE — 700102 HCHG RX REV CODE 250 W/ 637 OVERRIDE(OP): Performed by: HOSPITALIST

## 2017-10-21 PROCEDURE — 700101 HCHG RX REV CODE 250

## 2017-10-21 PROCEDURE — 770020 HCHG ROOM/CARE - TELE (206)

## 2017-10-21 PROCEDURE — 80053 COMPREHEN METABOLIC PANEL: CPT

## 2017-10-21 PROCEDURE — 94640 AIRWAY INHALATION TREATMENT: CPT

## 2017-10-21 PROCEDURE — 82803 BLOOD GASES ANY COMBINATION: CPT

## 2017-10-21 PROCEDURE — 71010 DX-CHEST-PORTABLE (1 VIEW): CPT

## 2017-10-21 PROCEDURE — 36415 COLL VENOUS BLD VENIPUNCTURE: CPT

## 2017-10-21 PROCEDURE — 84134 ASSAY OF PREALBUMIN: CPT

## 2017-10-21 PROCEDURE — 51798 US URINE CAPACITY MEASURE: CPT

## 2017-10-21 PROCEDURE — 80061 LIPID PANEL: CPT

## 2017-10-21 PROCEDURE — 700111 HCHG RX REV CODE 636 W/ 250 OVERRIDE (IP): Performed by: FAMILY MEDICINE

## 2017-10-21 PROCEDURE — 85025 COMPLETE CBC W/AUTO DIFF WBC: CPT

## 2017-10-21 PROCEDURE — 700102 HCHG RX REV CODE 250 W/ 637 OVERRIDE(OP): Performed by: FAMILY MEDICINE

## 2017-10-21 PROCEDURE — A9270 NON-COVERED ITEM OR SERVICE: HCPCS | Performed by: HOSPITALIST

## 2017-10-21 PROCEDURE — 36600 WITHDRAWAL OF ARTERIAL BLOOD: CPT

## 2017-10-21 PROCEDURE — 700111 HCHG RX REV CODE 636 W/ 250 OVERRIDE (IP): Performed by: HOSPITALIST

## 2017-10-21 PROCEDURE — 700101 HCHG RX REV CODE 250: Performed by: HOSPITALIST

## 2017-10-21 PROCEDURE — 99407 BEHAV CHNG SMOKING > 10 MIN: CPT

## 2017-10-21 PROCEDURE — 71020 DX-CHEST-2 VIEWS: CPT

## 2017-10-21 PROCEDURE — 82962 GLUCOSE BLOOD TEST: CPT

## 2017-10-21 PROCEDURE — 99232 SBSQ HOSP IP/OBS MODERATE 35: CPT | Performed by: FAMILY MEDICINE

## 2017-10-21 PROCEDURE — 81001 URINALYSIS AUTO W/SCOPE: CPT

## 2017-10-21 PROCEDURE — 80048 BASIC METABOLIC PNL TOTAL CA: CPT

## 2017-10-21 RX ORDER — DEXTROSE MONOHYDRATE 25 G/50ML
25 INJECTION, SOLUTION INTRAVENOUS
Status: DISCONTINUED | OUTPATIENT
Start: 2017-10-21 | End: 2017-10-24 | Stop reason: HOSPADM

## 2017-10-21 RX ORDER — FUROSEMIDE 10 MG/ML
40 INJECTION INTRAMUSCULAR; INTRAVENOUS ONCE
Status: COMPLETED | OUTPATIENT
Start: 2017-10-21 | End: 2017-10-21

## 2017-10-21 RX ORDER — ASPIRIN 325 MG
325 TABLET ORAL DAILY
Status: DISCONTINUED | OUTPATIENT
Start: 2017-10-21 | End: 2017-10-21

## 2017-10-21 RX ORDER — IPRATROPIUM BROMIDE AND ALBUTEROL SULFATE 2.5; .5 MG/3ML; MG/3ML
3 SOLUTION RESPIRATORY (INHALATION)
Status: DISCONTINUED | OUTPATIENT
Start: 2017-10-22 | End: 2017-10-24 | Stop reason: HOSPADM

## 2017-10-21 RX ADMIN — METHYLPREDNISOLONE SODIUM SUCCINATE 40 MG: 40 INJECTION, POWDER, FOR SOLUTION INTRAMUSCULAR; INTRAVENOUS at 13:29

## 2017-10-21 RX ADMIN — STANDARDIZED SENNA CONCENTRATE AND DOCUSATE SODIUM 2 TABLET: 8.6; 5 TABLET, FILM COATED ORAL at 08:52

## 2017-10-21 RX ADMIN — MIRTAZAPINE 30 MG: 30 TABLET, FILM COATED ORAL at 20:14

## 2017-10-21 RX ADMIN — CARVEDILOL 12.5 MG: 12.5 TABLET, FILM COATED ORAL at 08:51

## 2017-10-21 RX ADMIN — METHYLPREDNISOLONE SODIUM SUCCINATE 40 MG: 40 INJECTION, POWDER, FOR SOLUTION INTRAMUSCULAR; INTRAVENOUS at 18:42

## 2017-10-21 RX ADMIN — IPRATROPIUM BROMIDE AND ALBUTEROL SULFATE 3 ML: .5; 3 SOLUTION RESPIRATORY (INHALATION) at 17:48

## 2017-10-21 RX ADMIN — IPRATROPIUM BROMIDE AND ALBUTEROL SULFATE 3 ML: .5; 3 SOLUTION RESPIRATORY (INHALATION) at 13:57

## 2017-10-21 RX ADMIN — ENOXAPARIN SODIUM 40 MG: 100 INJECTION SUBCUTANEOUS at 08:52

## 2017-10-21 RX ADMIN — FUROSEMIDE 40 MG: 10 INJECTION, SOLUTION INTRAMUSCULAR; INTRAVENOUS at 18:22

## 2017-10-21 RX ADMIN — METHYLPREDNISOLONE SODIUM SUCCINATE 40 MG: 40 INJECTION, POWDER, FOR SOLUTION INTRAMUSCULAR; INTRAVENOUS at 05:34

## 2017-10-21 RX ADMIN — LISINOPRIL 20 MG: 20 TABLET ORAL at 08:51

## 2017-10-21 RX ADMIN — CARVEDILOL 12.5 MG: 12.5 TABLET, FILM COATED ORAL at 18:27

## 2017-10-21 RX ADMIN — INSULIN LISPRO 5 UNITS: 100 INJECTION, SOLUTION INTRAVENOUS; SUBCUTANEOUS at 20:11

## 2017-10-21 RX ADMIN — DOXYCYCLINE 100 MG: 100 TABLET ORAL at 20:14

## 2017-10-21 RX ADMIN — DOXYCYCLINE 100 MG: 100 TABLET ORAL at 08:51

## 2017-10-21 RX ADMIN — IPRATROPIUM BROMIDE AND ALBUTEROL SULFATE 3 ML: .5; 3 SOLUTION RESPIRATORY (INHALATION) at 06:35

## 2017-10-21 RX ADMIN — ALBUTEROL SULFATE 2.5 MG: 2.5 SOLUTION RESPIRATORY (INHALATION) at 18:00

## 2017-10-21 RX ADMIN — MORPHINE SULFATE 2 MG: 4 INJECTION INTRAVENOUS at 18:26

## 2017-10-21 ASSESSMENT — ENCOUNTER SYMPTOMS
DOUBLE VISION: 0
VOMITING: 0
SHORTNESS OF BREATH: 1
HEADACHES: 0
DIAPHORESIS: 0
CHILLS: 0
TREMORS: 0
DIZZINESS: 0
PHOTOPHOBIA: 0
NAUSEA: 0
FEVER: 0
BACK PAIN: 0
CLAUDICATION: 0
HEARTBURN: 0
MYALGIAS: 0
BLURRED VISION: 0
NECK PAIN: 0
TINGLING: 0

## 2017-10-21 ASSESSMENT — COPD QUESTIONNAIRES
DO YOU EVER COUGH UP ANY MUCUS OR PHLEGM?: NO/ONLY WITH OCCASIONAL COLDS OR INFECTIONS
HAVE YOU SMOKED AT LEAST 100 CIGARETTES IN YOUR ENTIRE LIFE: YES
COPD SCREENING SCORE: 4
DURING THE PAST 4 WEEKS HOW MUCH DID YOU FEEL SHORT OF BREATH: SOME OF THE TIME

## 2017-10-21 ASSESSMENT — PAIN SCALES - GENERAL
PAINLEVEL_OUTOF10: 0

## 2017-10-21 ASSESSMENT — PATIENT HEALTH QUESTIONNAIRE - PHQ9
2. FEELING DOWN, DEPRESSED, IRRITABLE, OR HOPELESS: NOT AT ALL
SUM OF ALL RESPONSES TO PHQ9 QUESTIONS 1 AND 2: 0
SUM OF ALL RESPONSES TO PHQ QUESTIONS 1-9: 0
1. LITTLE INTEREST OR PLEASURE IN DOING THINGS: NOT AT ALL

## 2017-10-21 ASSESSMENT — LIFESTYLE VARIABLES: DO YOU DRINK ALCOHOL: NO

## 2017-10-21 NOTE — FLOWSHEET NOTE
10/21/17 0223   Interdisciplinary Plan of Care-Outcomes    Bronchodilator Outcome Patient at Stable Baseline   Therapy Not Performed   Type of Therapy Not Performed SVN   Reason Therapy Not Performed (BS clr/dim 95% RA, no distress. RN aware )

## 2017-10-21 NOTE — FLOWSHEET NOTE
10/20/17 2005   Events/Summary/Plan   Non-Invasive Resp Device Site Inspection Completed Intact   Interdisciplinary Plan of Care-Goals (Indications)   Obstructive Ventilatory Defect or Pulmonary Disease without Obvious Obstruction History / Diagnosis   Interdisciplinary Plan of Care-Outcomes    Bronchodilator Outcome Patient at Stable Baseline   Education   Education Yes - Pt. / Family has been Instructed in use of Respiratory Equipment   RT Assessment of Delivered Medications   Evaluation of Medication Delivery Daily Yes-- Pt /Family has been Instructed in use of Respiratory Medications and Adverse Reactions   SVN Group   #SVN Performed Yes   Given By: Mouthpiece   Respiratory WDL   Respiratory (WDL) WDL   Chest Exam   Respiration 18   Pulse 91   Breath Sounds   Pre/Post Intervention Post Intervention Assessment   RUL Breath Sounds Clear   RML Breath Sounds Clear   RLL Breath Sounds Diminished   CINDY Breath Sounds Clear   LLL Breath Sounds Diminished   Oximetry   Continuous Oximetry Yes   Oxygen   Pulse Oximetry 95 %   O2 (LPM) 1  (weaned to .5L post tx)   O2 Daily Delivery Respiratory  Silicone Nasal Cannula

## 2017-10-21 NOTE — CARE PLAN
Problem: Communication  Goal: The ability to communicate needs accurately and effectively will improve    Intervention: Educate patient and significant other/support system about the plan of care, procedures, treatments, medications and allow for questions  Pt as well as pt's daughter, with pt's verbal consent, updated on plan of care and medications. Understanding voiced.      Problem: Safety  Goal: Will remain free from falls    Intervention: Implement fall precautions  Side rails up x2, bed in lowest/locked position, call light within reach and non-skids in place to BLE. Pt complies by calling for assistance when needed.

## 2017-10-21 NOTE — PROGRESS NOTES
Received report. Assessed pt. Discussed plan of care. AOx4 Call light in reach. Fall precautions in place. Bed in lowest position, bed locked, RN and CNA numbers provided. Provided water. Hourly rounding in practice.

## 2017-10-21 NOTE — FLOWSHEET NOTE
10/20/17 2325   Events/Summary/Plan   Events/Summary/Plan svn w/held - 02 weaned to RA Sp02 94%. No indication for svn at this time.    Interdisciplinary Plan of Care-Outcomes    Bronchodilator Outcome Patient at Stable Baseline   Therapy Not Performed   Type of Therapy Not Performed SVN   Reason Therapy Not Performed (no indicatrion)   Respiratory WDL   Respiratory (WDL) WDL   Chest Exam   Respiration 16   Pulse 91   Breath Sounds   RUL Breath Sounds Clear   RML Breath Sounds Clear   RLL Breath Sounds Diminished   CINDY Breath Sounds Clear   LLL Breath Sounds Diminished   Oxygen   Pulse Oximetry 94 %   O2 (LPM) 0   O2 (FiO2) 21   O2 Daily Delivery Respiratory  Room Air with O2 Available

## 2017-10-21 NOTE — PROGRESS NOTES
(0720) Assumed care of pt at this time. Rec'd lying in bed with HOB elevated. AAO X4. Vision impaired. Resp even and nonlabored on room air.  at bedside. Pt denies any SOB or chest pain at this time. Lung sounds decreased TAB. Skin w/d to touch. Tele box intact. RAC 20G saline lock WNL. Abdomen soft and nondistended. BS present x4. Nonskid socks in place to BLE. Pt denies any needs or complaints at this time. Side rails up x2, bed in lowest/locked position and call light within reach. Will continue to monitor this shift.    (1200) Pt sent to CT via wheel chair. Awaiting return to unit. Daughter, Lisset, updated on pt's plan of care with verbal consent from patient himself.    (1220) Pt returned to unit. Assisted to bed safely. Denies any needs or complaints at this time. Side rails up x2, bed in lowest/locked position and call light within reach. Will continue to monitor this shift.    (1610) Due to pt not voiding this shift, bladder scan ordered which reads 419 ml.    (1640) Pt urinated a total of 100 ml. PVR ordered at this time per this RN.    (1710) PVR reading = 300 ml. No intervention required at this time.    (1750) Dr. Hoff phoned and informed that after about 30 minutes of resting after ambulating to restroom, pt is in respiratory distress and diaphoretic. As verbally ordered, stat ABG, CXR and 40mg IV Lasix ordered.    (1755) Rapid Response called after increased diaphoresis, respiratory distress/hypoxia. 40mg IV Lasix given as ordered. Additionally, 2 mg IV morphine given. CXR, ABGs obtained. O2 @4 L placed; current O2 sat 95%. Pt positioned in HF. Remains pale; however, breathing less labored. Dr. Hoff at bedside.

## 2017-10-21 NOTE — PROGRESS NOTES
[facility]ist Progress Note    Date of Service: 10/21/2017    Chief Complaint  57 y.o. male admitted 10/20/2017 with ACUTE ON CHRONIC COPD EXACERBATION    Interval Problem Update  NONE    Consultants/Specialty  NONE    Disposition  PENDING        Review of Systems   Constitutional: Negative for chills, diaphoresis and fever.   HENT: Negative for hearing loss and tinnitus.    Eyes: Negative for blurred vision, double vision and photophobia.   Respiratory: Positive for shortness of breath.    Cardiovascular: Negative for chest pain and claudication.   Gastrointestinal: Negative for heartburn, nausea and vomiting.   Genitourinary: Negative for dysuria, frequency and urgency.   Musculoskeletal: Negative for back pain, myalgias and neck pain.   Skin: Negative for itching and rash.   Neurological: Negative for dizziness, tingling, tremors and headaches.      Physical Exam  Laboratory/Imaging   Hemodynamics  Temp (24hrs), Av.6 °C (97.8 °F), Min:36.3 °C (97.3 °F), Max:36.9 °C (98.5 °F)   Temperature: 36.7 °C (98 °F)  Pulse  Av.3  Min: 87  Max: 107 Heart Rate (Monitored): 96  Blood Pressure: 159/78, NIBP: 144/82      Respiratory      Respiration: 20, Pulse Oximetry: 93 %, O2 Daily Delivery Respiratory : Room Air with O2 Available     Given By:: Mouthpiece  RUL Breath Sounds: Clear, RML Breath Sounds: Clear, RLL Breath Sounds: Clear, CINDY Breath Sounds: Clear, LLL Breath Sounds: Clear    Fluids  No intake or output data in the 24 hours ending 10/21/17 1120    Nutrition  Orders Placed This Encounter   Procedures   • Diet Order     Standing Status:   Standing     Number of Occurrences:   1     Order Specific Question:   Diet:     Answer:   Regular [1]     Physical Exam   Constitutional: He is oriented to person, place, and time. No distress.   HENT:   Head: Normocephalic and atraumatic.   Eyes: Right eye exhibits no discharge. Left eye exhibits no discharge.   Neck: Neck supple. No JVD present.   Cardiovascular:  Normal rate and regular rhythm.    Pulmonary/Chest: No stridor. No respiratory distress.   DIMINISHED BREATH SOUNDS BILATERALLY   Abdominal: Soft. He exhibits no distension. There is no tenderness. There is no rebound.   Musculoskeletal: He exhibits no edema or tenderness.   Neurological: He is alert and oriented to person, place, and time.   Skin: Skin is warm and dry. He is not diaphoretic.       Recent Labs      10/20/17   0930  10/21/17   0332   WBC  7.9  8.0   RBC  3.80*  3.32*   HEMOGLOBIN  10.5*  9.2*   HEMATOCRIT  33.9*  29.1*   MCV  89.2  87.7   MCH  27.6  27.7   MCHC  31.0*  31.6*   RDW  53.2*  52.3*   PLATELETCT  475*  441   MPV  10.5  11.3     Recent Labs      10/20/17   0930  10/21/17   0332   SODIUM  142  139   POTASSIUM  3.3*  3.4*   CHLORIDE  109  108   CO2  17*  18*   GLUCOSE  154*  141*   BUN  11  18   CREATININE  1.01  1.09   CALCIUM  8.6  8.4*             Recent Labs      10/21/17   0332   TRIGLYCERIDE  115   HDL  32*   LDL  61          Assessment/Plan     * COPD exacerbation (CMS-HCC)   Assessment & Plan    ACUTE ON CHRONIC WITH ACUTE RESP FAILURE  SOLUMEDROL  RESP PROTOCOL  DOXYCYCLINE.          Acute on chronic respiratory failure with hypoxia (CMS-HCC)   Assessment & Plan    Continue with oxygen therapy as needed.          Troponin I above reference range   Assessment & Plan    NO CHEST PAIN  EKG IS REVIEWED WITH NONE SPECIFIC ST CHANGES  TROPS MILDLY ELEVATED  APPEARS TO BE 2ND TO DEMAND ISCHEMIA WITH TACHYCARDIA  WILL GET A ECHO        High anion gap metabolic acidosis   Assessment & Plan    ?starvation ketosis, as patient appears cachectic.  Will check urinalysis.         Normocytic anemia   Assessment & Plan    Currently stable, however will monitor.  Patient reports taking 12-15 tablets of ibuprofen daily for last several days.  He is unaware of any black or tarry stools as he has a vision deficit.  Given hypoxia, concern for possible cause.  Transfuse if needed for hemoglobin less than  7 gm/dL  F/U WITH PCP AS AN OUTPT FOR GI REFERAL          Severe protein-calorie malnutrition (CMS-HCC)   Assessment & Plan    Encourage oral intake.    PRE-ALBUMIN  MODERATE   dietary consultation        Cachexia (CMS-HCC)   Assessment & Plan    Likely due to chronic disease.          Essential hypertension   Assessment & Plan    COREG  LISINIOPRIL            Cabral catheter::  No Cabral  DVT prophylaxis pharmacological::  Enoxaparin (Lovenox)

## 2017-10-21 NOTE — DIETARY
Nutrition Services: Pt seen for low BMI < 19 (= 17.6) and consult for poor PO intake.    Pt is a 58 yo M admitted for COPD exacerbation.  Past Medical History:   Diagnosis Date   • Alcohol abuse     Pt no longer drinking   • Anxiety    • Chronic obstructive pulmonary disease (CMS-HCC)    • Dental caries    • Hypertension    Visited pt at bedside, pt appears thin, elderly for age, and has poor dentition. Pt states that he usually has a healthy appetite at home and tries to drink 3 Ensure supplements a day. Pt states that he weighed 170 lbs prior to being dx w/ COPD (he was unable to provide when dx). More recently, pt states he weighed 124 lbs earlier this year. Current wt = 115 lbs, pt with 7% wt loss however was unable to provide exact time frame. Pt likely with chronic disease related malnutrition, r/t increased work of breathing 2' to COPD, as evidenced by low BMI, wt loss, and physical appearance.     Explained to pt the importance of consuming high kcal/high protein foods d/t increased caloric needs 2' to disease state. Encouraged pt to continue drinking Ensure at home and asked RN for a supplement order so we could provide Boost Plus TID. Will send high protein milkshake BID per pt preference as well.      Diet: Regular  Wt: 52.5 kg  BMI: 17.6 - underweight  Labs: K+ 3.4, glucose 141  Meds: solu-medrol, remeron, bowel meds  GI: + BM today  Skin: no skin breakdown documented at this time    Plan/Recommend:  1. Encourage PO intake of meals and supplements  2. Nutrition Representative to see daily for meal preferences  3. Please record intake as percentage of meals consumed   4. Once supplement order in place - Boost Plus TID   5. High protein milkshakes BID  6. RD to monitor PO intake, wt trends, and nutrition labs/meds

## 2017-10-21 NOTE — PROGRESS NOTES
Received bedside report from [facility] from ED, going into T714 bed 2, Pt assessed, A&Ox4, BP is elevated, other vital signs stable, pt has 0/10 of pain, Lung sounds clear/diminished, no SOB Noted.  Pt updated on plan of care, Call light within reach, personal belongings within reach.  Bed in lowest position, Bed Strip alarm is on.  Pt ambulates via 1 person assist, hand held. Tele monitor on. Will continue to monitor. Hourly rounding in place.

## 2017-10-22 ENCOUNTER — APPOINTMENT (OUTPATIENT)
Dept: RADIOLOGY | Facility: MEDICAL CENTER | Age: 57
DRG: 291 | End: 2017-10-22
Attending: HOSPITALIST
Payer: COMMERCIAL

## 2017-10-22 LAB
ANION GAP SERPL CALC-SCNC: 15 MMOL/L (ref 0–11.9)
BUN SERPL-MCNC: 25 MG/DL (ref 8–22)
CALCIUM SERPL-MCNC: 8.5 MG/DL (ref 8.5–10.5)
CHLORIDE SERPL-SCNC: 108 MMOL/L (ref 96–112)
CO2 SERPL-SCNC: 16 MMOL/L (ref 20–33)
CREAT SERPL-MCNC: 1.63 MG/DL (ref 0.5–1.4)
GFR SERPL CREATININE-BSD FRML MDRD: 44 ML/MIN/1.73 M 2
GLUCOSE BLD-MCNC: 140 MG/DL (ref 65–99)
GLUCOSE BLD-MCNC: 155 MG/DL (ref 65–99)
GLUCOSE BLD-MCNC: 156 MG/DL (ref 65–99)
GLUCOSE BLD-MCNC: 236 MG/DL (ref 65–99)
GLUCOSE SERPL-MCNC: 377 MG/DL (ref 65–99)
POTASSIUM SERPL-SCNC: 3.8 MMOL/L (ref 3.6–5.5)
PREALB SERPL-MCNC: 12 MG/DL (ref 18–38)
SODIUM SERPL-SCNC: 139 MMOL/L (ref 135–145)

## 2017-10-22 PROCEDURE — 700111 HCHG RX REV CODE 636 W/ 250 OVERRIDE (IP): Performed by: HOSPITALIST

## 2017-10-22 PROCEDURE — 770020 HCHG ROOM/CARE - TELE (206)

## 2017-10-22 PROCEDURE — 3E0234Z INTRODUCTION OF SERUM, TOXOID AND VACCINE INTO MUSCLE, PERCUTANEOUS APPROACH: ICD-10-PCS | Performed by: HOSPITALIST

## 2017-10-22 PROCEDURE — 90686 IIV4 VACC NO PRSV 0.5 ML IM: CPT | Performed by: FAMILY MEDICINE

## 2017-10-22 PROCEDURE — 700111 HCHG RX REV CODE 636 W/ 250 OVERRIDE (IP): Performed by: FAMILY MEDICINE

## 2017-10-22 PROCEDURE — 700102 HCHG RX REV CODE 250 W/ 637 OVERRIDE(OP): Performed by: HOSPITALIST

## 2017-10-22 PROCEDURE — 94640 AIRWAY INHALATION TREATMENT: CPT

## 2017-10-22 PROCEDURE — 700101 HCHG RX REV CODE 250: Performed by: FAMILY MEDICINE

## 2017-10-22 PROCEDURE — 700105 HCHG RX REV CODE 258: Performed by: FAMILY MEDICINE

## 2017-10-22 PROCEDURE — 82962 GLUCOSE BLOOD TEST: CPT | Mod: 91

## 2017-10-22 PROCEDURE — A9270 NON-COVERED ITEM OR SERVICE: HCPCS | Performed by: HOSPITALIST

## 2017-10-22 PROCEDURE — 90471 IMMUNIZATION ADMIN: CPT

## 2017-10-22 PROCEDURE — 99232 SBSQ HOSP IP/OBS MODERATE 35: CPT | Performed by: FAMILY MEDICINE

## 2017-10-22 RX ORDER — SODIUM CHLORIDE 9 MG/ML
INJECTION, SOLUTION INTRAVENOUS CONTINUOUS
Status: DISCONTINUED | OUTPATIENT
Start: 2017-10-22 | End: 2017-10-23

## 2017-10-22 RX ORDER — HEPARIN SODIUM 5000 [USP'U]/ML
5000 INJECTION, SOLUTION INTRAVENOUS; SUBCUTANEOUS EVERY 12 HOURS
Status: DISCONTINUED | OUTPATIENT
Start: 2017-10-23 | End: 2017-10-24 | Stop reason: HOSPADM

## 2017-10-22 RX ADMIN — INSULIN LISPRO 2 UNITS: 100 INJECTION, SOLUTION INTRAVENOUS; SUBCUTANEOUS at 05:51

## 2017-10-22 RX ADMIN — CARVEDILOL 12.5 MG: 12.5 TABLET, FILM COATED ORAL at 18:27

## 2017-10-22 RX ADMIN — METHYLPREDNISOLONE SODIUM SUCCINATE 40 MG: 40 INJECTION, POWDER, FOR SOLUTION INTRAMUSCULAR; INTRAVENOUS at 12:22

## 2017-10-22 RX ADMIN — SODIUM CHLORIDE: 9 INJECTION, SOLUTION INTRAVENOUS at 12:23

## 2017-10-22 RX ADMIN — METHYLPREDNISOLONE SODIUM SUCCINATE 40 MG: 40 INJECTION, POWDER, FOR SOLUTION INTRAMUSCULAR; INTRAVENOUS at 18:28

## 2017-10-22 RX ADMIN — LISINOPRIL 20 MG: 20 TABLET ORAL at 08:21

## 2017-10-22 RX ADMIN — CARVEDILOL 12.5 MG: 12.5 TABLET, FILM COATED ORAL at 08:20

## 2017-10-22 RX ADMIN — METHYLPREDNISOLONE SODIUM SUCCINATE 40 MG: 40 INJECTION, POWDER, FOR SOLUTION INTRAMUSCULAR; INTRAVENOUS at 00:23

## 2017-10-22 RX ADMIN — IPRATROPIUM BROMIDE AND ALBUTEROL SULFATE 3 ML: .5; 3 SOLUTION RESPIRATORY (INHALATION) at 14:02

## 2017-10-22 RX ADMIN — IPRATROPIUM BROMIDE AND ALBUTEROL SULFATE 3 ML: .5; 3 SOLUTION RESPIRATORY (INHALATION) at 06:25

## 2017-10-22 RX ADMIN — METHYLPREDNISOLONE SODIUM SUCCINATE 40 MG: 40 INJECTION, POWDER, FOR SOLUTION INTRAMUSCULAR; INTRAVENOUS at 05:47

## 2017-10-22 RX ADMIN — INSULIN LISPRO 3 UNITS: 100 INJECTION, SOLUTION INTRAVENOUS; SUBCUTANEOUS at 12:35

## 2017-10-22 RX ADMIN — ENOXAPARIN SODIUM 40 MG: 100 INJECTION SUBCUTANEOUS at 08:21

## 2017-10-22 RX ADMIN — INSULIN LISPRO 2 UNITS: 100 INJECTION, SOLUTION INTRAVENOUS; SUBCUTANEOUS at 00:25

## 2017-10-22 RX ADMIN — DOXYCYCLINE 100 MG: 100 TABLET ORAL at 08:21

## 2017-10-22 RX ADMIN — DOXYCYCLINE 100 MG: 100 TABLET ORAL at 19:50

## 2017-10-22 RX ADMIN — MIRTAZAPINE 30 MG: 30 TABLET, FILM COATED ORAL at 19:50

## 2017-10-22 RX ADMIN — IPRATROPIUM BROMIDE AND ALBUTEROL SULFATE 3 ML: .5; 3 SOLUTION RESPIRATORY (INHALATION) at 20:49

## 2017-10-22 RX ADMIN — INFLUENZA A VIRUS A/MICHIGAN/45/2015 X-275 (H1N1) ANTIGEN (FORMALDEHYDE INACTIVATED), INFLUENZA A VIRUS A/HONG KONG/4801/2014 X-263B (H3N2) ANTIGEN (FORMALDEHYDE INACTIVATED), INFLUENZA B VIRUS B/PHUKET/3073/2013 ANTIGEN (FORMALDEHYDE INACTIVATED), AND INFLUENZA B VIRUS B/BRISBANE/60/2008 ANTIGEN (FORMALDEHYDE INACTIVATED) 0.5 ML: 15; 15; 15; 15 INJECTION, SUSPENSION INTRAMUSCULAR at 05:47

## 2017-10-22 ASSESSMENT — ENCOUNTER SYMPTOMS
CLAUDICATION: 0
SHORTNESS OF BREATH: 1
PHOTOPHOBIA: 0
DIAPHORESIS: 0
NECK PAIN: 0
BLURRED VISION: 0
TINGLING: 0
VOMITING: 0
HEARTBURN: 0
BACK PAIN: 0
HEADACHES: 0
FEVER: 0
NAUSEA: 0
DIZZINESS: 0
MYALGIAS: 0
CHILLS: 0
DOUBLE VISION: 0
TREMORS: 0

## 2017-10-22 ASSESSMENT — LIFESTYLE VARIABLES: DO YOU DRINK ALCOHOL: NO

## 2017-10-22 ASSESSMENT — PATIENT HEALTH QUESTIONNAIRE - PHQ9
1. LITTLE INTEREST OR PLEASURE IN DOING THINGS: NOT AT ALL
2. FEELING DOWN, DEPRESSED, IRRITABLE, OR HOPELESS: NOT AT ALL
SUM OF ALL RESPONSES TO PHQ QUESTIONS 1-9: 0
SUM OF ALL RESPONSES TO PHQ9 QUESTIONS 1 AND 2: 0

## 2017-10-22 ASSESSMENT — PAIN SCALES - GENERAL
PAINLEVEL_OUTOF10: 0

## 2017-10-22 ASSESSMENT — COPD QUESTIONNAIRES
HAVE YOU SMOKED AT LEAST 100 CIGARETTES IN YOUR ENTIRE LIFE: YES
DO YOU EVER COUGH UP ANY MUCUS OR PHLEGM?: NO/ONLY WITH OCCASIONAL COLDS OR INFECTIONS
DURING THE PAST 4 WEEKS HOW MUCH DID YOU FEEL SHORT OF BREATH: SOME OF THE TIME
COPD SCREENING SCORE: 4

## 2017-10-22 NOTE — CARE PLAN
Problem: Communication  Goal: The ability to communicate needs accurately and effectively will improve    Intervention: Educate patient and significant other/support system about the plan of care, procedures, treatments, medications and allow for questions  Discuss plan of care. Address concerns and questions. Provide therapeutic communication.       Problem: Safety  Goal: Will remain free from injury    Intervention: Provide assistance with mobility  Educate pt regarding safety precautions and to call for assistance.       Problem: Respiratory:  Goal: Respiratory status will improve    Intervention: Administer and titrate oxygen therapy  Educate pt regarding oxygen use and titrate as needed.       Problem: Fluid Volume:  Goal: Will maintain balanced intake and output    Intervention: Monitor, educate, and encourage compliance with therapeutic intake of liquids  Educate pt regarding fluid volume overload and purpose of diuretics.

## 2017-10-22 NOTE — CARE PLAN
Problem: Oxygenation:  Goal: Maintain adequate oxygenation dependent on patient condition    Intervention: Levels of oxygenation will improve to baseline  PT CURRENTLY ON 2L NC WITH O2 SAT ABOVE 90% WILL CONTINUE TO TITRATE AS TOLERATED.

## 2017-10-22 NOTE — PROGRESS NOTES
[facility]ist Progress Note    Date of Service: 10/22/2017    Chief Complaint  57 y.o. male admitted 10/20/2017 with ACUTE ON CHRONIC COPD EXACERBATION    Interval Problem Update  NONE    Consultants/Specialty  NONE    Disposition  PENDING        Review of Systems   Constitutional: Negative for chills, diaphoresis and fever.   HENT: Negative for hearing loss and tinnitus.    Eyes: Negative for blurred vision, double vision and photophobia.   Respiratory: Positive for shortness of breath.    Cardiovascular: Negative for chest pain and claudication.   Gastrointestinal: Negative for heartburn, nausea and vomiting.   Genitourinary: Negative for dysuria, frequency and urgency.   Musculoskeletal: Negative for back pain, myalgias and neck pain.   Skin: Negative for itching and rash.   Neurological: Negative for dizziness, tingling, tremors and headaches.      Physical Exam  Laboratory/Imaging   Hemodynamics  Temp (24hrs), Av.5 °C (97.7 °F), Min:36 °C (96.8 °F), Max:37.2 °C (99 °F)   Temperature: 36.3 °C (97.3 °F)  Pulse  Av.3  Min: 72  Max: 128    Blood Pressure: 120/84      Respiratory      Respiration: 18, Pulse Oximetry: 96 %, O2 Daily Delivery Respiratory : Silicone Nasal Cannula     Given By:: Mouthpiece, Work Of Breathing / Effort: Mild  RUL Breath Sounds: Diminished, RML Breath Sounds: Diminished, RLL Breath Sounds: Diminished, CINDY Breath Sounds: Diminished, LLL Breath Sounds: Diminished    Fluids    Intake/Output Summary (Last 24 hours) at 10/22/17 1101  Last data filed at 10/22/17 0800   Gross per 24 hour   Intake              160 ml   Output              540 ml   Net             -380 ml       Nutrition  Orders Placed This Encounter   Procedures   • Diet Order     Standing Status:   Standing     Number of Occurrences:   1     Order Specific Question:   Diet:     Answer:   Regular [1]     Comments:   Boost Plus with meals     Physical Exam   Constitutional: He is oriented to person, place, and time.  No distress.   HENT:   Head: Normocephalic and atraumatic.   Eyes: Right eye exhibits no discharge. Left eye exhibits no discharge.   Neck: Neck supple. No JVD present.   Cardiovascular: Normal rate and regular rhythm.    Pulmonary/Chest: No stridor. No respiratory distress.   DIMINISHED BREATH SOUNDS BILATERALLY   Abdominal: Soft. He exhibits no distension. There is no tenderness. There is no rebound.   Musculoskeletal: He exhibits no edema or tenderness.   Neurological: He is alert and oriented to person, place, and time.   Skin: Skin is warm and dry. He is not diaphoretic.       Recent Labs      10/20/17   0930  10/21/17   0332  10/21/17   1818   WBC  7.9  8.0  9.8   RBC  3.80*  3.32*  3.39*   HEMOGLOBIN  10.5*  9.2*  9.3*   HEMATOCRIT  33.9*  29.1*  30.3*   MCV  89.2  87.7  89.4   MCH  27.6  27.7  27.4   MCHC  31.0*  31.6*  30.7*   RDW  53.2*  52.3*  55.2*   PLATELETCT  475*  441  572*   MPV  10.5  11.3  10.8     Recent Labs      10/20/17   0930  10/21/17   0332  10/21/17   1818   SODIUM  142  139  139   POTASSIUM  3.3*  3.4*  3.8   CHLORIDE  109  108  108   CO2  17*  18*  16*   GLUCOSE  154*  141*  377*   BUN  11  18  25*   CREATININE  1.01  1.09  1.63*   CALCIUM  8.6  8.4*  8.5             Recent Labs      10/21/17   0332   TRIGLYCERIDE  115   HDL  32*   LDL  61          Assessment/Plan     * COPD exacerbation (CMS-HCC)   Assessment & Plan    ACUTE ON CHRONIC WITH ACUTE RESP FAILURE  SOLUMEDROL  RESP PROTOCOL  DOXYCYCLINE  WILL CONTINUE OBSERVING  MOST LIKLEY NEEDS O2 AT HOME          Acute on chronic respiratory failure with hypoxia (CMS-HCC)   Assessment & Plan    Continue with oxygen therapy as needed.          Troponin I above reference range   Assessment & Plan    NO CHEST PAIN  EKG IS REVIEWED WITH NONE SPECIFIC ST CHANGES  TROPS MILDLY ELEVATED  APPEARS TO BE 2ND TO DEMAND ISCHEMIA WITH TACHYCARDIA  WILL GET A ECHO        High anion gap metabolic acidosis   Assessment & Plan    ?starvation ketosis, as  patient appears cachectic.  Will check urinalysis.         Normocytic anemia   Assessment & Plan    Currently stable, however will monitor.  Patient reports taking 12-15 tablets of ibuprofen daily for last several days.  He is unaware of any black or tarry stools as he has a vision deficit.  Given hypoxia, concern for possible cause.  Transfuse if needed for hemoglobin less than 7 gm/dL  F/U WITH PCP AS AN OUTPT FOR GI REFERAL          Severe protein-calorie malnutrition (CMS-HCC)   Assessment & Plan    Encourage oral intake.    PRE-ALBUMIN  MODERATE   dietary consultation        Cachexia (CMS-HCC)   Assessment & Plan    Likely due to chronic disease.          Essential hypertension   Assessment & Plan    COREG  LISINIOPRIL            Cabral catheter::  No Cabral  DVT prophylaxis pharmacological::  Heparin

## 2017-10-22 NOTE — FLOWSHEET NOTE
"   10/21/17 1752   Events/Summary/Plan   Events/Summary/Plan SVN given, asked RN to call rapid response   SVN Group   #SVN Performed Yes   Given By: Mouthpiece   Chest Exam   Respiration (!) 26   Pulse (!) 128   Breath Sounds   Pre/Post Intervention Pre Intervention Assessment   RUL Breath Sounds Diminished;Fine Crackles;Expiratory Wheezes   RML Breath Sounds Diminished   RLL Breath Sounds Diminished   CINDY Breath Sounds Diminished;Fine Crackles;Expiratory Wheezes   LLL Breath Sounds Diminished   Oxygen   Pulse Oximetry 90 %   O2 (LPM) 4   O2 Daily Delivery Respiratory  Silicone Nasal Cannula   Pt condition changed from earlier this shift. Pt diaphoretic, sob, c/o \"can't breath\". Neb tx given, oxygen in place, and Rapid response called by the RN.   "

## 2017-10-22 NOTE — PROGRESS NOTES
(0705) Assumed care of pt at this time. Rec'd lying in bed with HOB elevated. AAO X4. Pt vision impaired. Respirations even and nonlabored on O2 @2L via NC.  at bedside reading 98%. Pt denies any SOB or chest pain at this time. Lung sounds decreased TAB. Skin w/d to touch. Tele box intact. RAC 20G saline lock WNL. Abdomen soft and nondistended. BS present x4. Nonskid socks in place to BLE. Pt denies any needs or complaints at this time. Side rails up x2, bed in lowest/locked position and call light within reach. Will continue to monitor this shift.    (1700) Dr. Hoff phoned for clarification regarding CT and need for contrast. MD states that at this time CT will be cancelled due to pt's CMP. Mikey in [place] phoned and informed.    (1715) CNA attempted to complete oxygen trial; however, after ambulating 5 feet without oxygen, pt became dizzy, anxious and dyspneic. O2 saturation 86% on room air at this time. Pt returned to bed with assistance at this time and placed back on oxygen. States that he does not want to attempt trial again at this time and that his family has plans for oxygen setup at home. Encouraged x2 to continue per this RN; However, pt states that he cannot at this time.

## 2017-10-22 NOTE — CODE DOCUMENTATION
Dr. Hoff at the bedside, MD to review the chest x ray and the ABG. MD orders for morphine. No additional orders at this time.

## 2017-10-22 NOTE — PROGRESS NOTES
Received report. Assessed pt. Discussed plan of care. AOx4 Call light in reach. Fall precautions in place. Bed in lowest position, bed locked, RN and CNA numbers provided. Provided water. Educated pt regarding oxygen use. Hourly rounding in practice.

## 2017-10-22 NOTE — RESPIRATORY CARE
COPD Education by COPD Clinical Educator  10/21/2017 at 15:28 PM by Latricia Camarena    Patient interviewed by COPD education team.  Patient refused full COPD program at this time, but agreed to short intervention.  A comprehensive packet including information about COPD, treatments, and smoking cessation given and discussed.

## 2017-10-22 NOTE — RESPIRATORY CARE
Respiratory Rapid Response Note    Symptoms: Hypoxic, increased WOB, diaphoretic   #SVN Performed: Yes   Breath Sounds  Pre/Post Intervention: Pre Intervention Assessment (10/21/17 1752)  RUL Breath Sounds: Diminished;Fine Crackles;Expiratory Wheezes (10/21/17 1752)  RML Breath Sounds: Diminished (10/21/17 1752)  RLL Breath Sounds: Diminished (10/21/17 1752)  CINDY Breath Sounds: Diminished;Fine Crackles;Expiratory Wheezes (10/21/17 1752)  LLL Breath Sounds: Diminished (10/21/17 1752)     ABG Results:   7.23  41.8  76  17.5  -9    O2 (LPM): 4 (10/21/17 1752)  O2 Daily Delivery Respiratory : Silicone Nasal Cannula (10/21/17 1752)

## 2017-10-22 NOTE — PROGRESS NOTES
I WAS CALLED BY THE NURSE TO SEE THE PT,HE STARTED TO HAVE REPS DISTRESS.ALSO RPID RESPONSE WAS CALLED.I CAME TO SEE THE PT WHO IS AWAKE AND PALE.  V.S;LOW 90S WITH 4 LITERS OF O2 BP 170S/110S.  ORDERED ABG AND SAT CXRAY.  ABF RESULT WITH PCO OF 41 AND PO2 OF 70S    GEN ONOFRE:LOKKS PALE AND ANXIOUS  HEENT;NECK SUPPLE  CVS;S1 AND S2 TACHY  LUNGS;DIMINISHED BREATH SOUNDS BILATERALLY BUT HE IS MOVING AIR  ABD;SOFT  LOER EXT;NO EDEMA    A/P: 57YR OLD M WITH ACUTE RESP DISTRESS  MULTIVFACTORIAL  ANXIETY  COPD EXACERBATION  ABG RESULT IS NOTED  LOW REDMAN MORPHINE IV IS GIVEN  LASIX WAS GIVEN PRIOR TO MY ARIVAL  HE ALREADY FEELS BETTER  CXRAY RESULT IS STILL PENDING      HTN  COREG IS GIVEN  WILL MONITOR

## 2017-10-23 ENCOUNTER — APPOINTMENT (OUTPATIENT)
Dept: RADIOLOGY | Facility: MEDICAL CENTER | Age: 57
DRG: 291 | End: 2017-10-23
Attending: HOSPITALIST
Payer: COMMERCIAL

## 2017-10-23 PROBLEM — N28.9 RENAL INSUFFICIENCY: Status: ACTIVE | Noted: 2017-10-23

## 2017-10-23 LAB
ANION GAP SERPL CALC-SCNC: 8 MMOL/L (ref 0–11.9)
BASOPHILS # BLD AUTO: 0 % (ref 0–1.8)
BASOPHILS # BLD: 0 K/UL (ref 0–0.12)
BUN SERPL-MCNC: 29 MG/DL (ref 8–22)
CALCIUM SERPL-MCNC: 8.2 MG/DL (ref 8.5–10.5)
CHLORIDE SERPL-SCNC: 111 MMOL/L (ref 96–112)
CO2 SERPL-SCNC: 23 MMOL/L (ref 20–33)
CREAT SERPL-MCNC: 1.13 MG/DL (ref 0.5–1.4)
EKG IMPRESSION: NORMAL
EOSINOPHIL # BLD AUTO: 0 K/UL (ref 0–0.51)
EOSINOPHIL NFR BLD: 0 % (ref 0–6.9)
ERYTHROCYTE [DISTWIDTH] IN BLOOD BY AUTOMATED COUNT: 54.1 FL (ref 35.9–50)
GFR SERPL CREATININE-BSD FRML MDRD: >60 ML/MIN/1.73 M 2
GLUCOSE BLD-MCNC: 133 MG/DL (ref 65–99)
GLUCOSE BLD-MCNC: 152 MG/DL (ref 65–99)
GLUCOSE BLD-MCNC: 156 MG/DL (ref 65–99)
GLUCOSE BLD-MCNC: 210 MG/DL (ref 65–99)
GLUCOSE SERPL-MCNC: 144 MG/DL (ref 65–99)
HCT VFR BLD AUTO: 31.7 % (ref 42–52)
HGB BLD-MCNC: 9.8 G/DL (ref 14–18)
IMM GRANULOCYTES # BLD AUTO: 0.05 K/UL (ref 0–0.11)
IMM GRANULOCYTES NFR BLD AUTO: 0.4 % (ref 0–0.9)
LV EJECT FRACT  99904: 40
LV EJECT FRACT MOD 2C 99903: 24.77
LV EJECT FRACT MOD 4C 99902: 24.65
LV EJECT FRACT MOD BP 99901: 20.75
LYMPHOCYTES # BLD AUTO: 1.11 K/UL (ref 1–4.8)
LYMPHOCYTES NFR BLD: 9.4 % (ref 22–41)
MCH RBC QN AUTO: 27.4 PG (ref 27–33)
MCHC RBC AUTO-ENTMCNC: 30.9 G/DL (ref 33.7–35.3)
MCV RBC AUTO: 88.5 FL (ref 81.4–97.8)
MONOCYTES # BLD AUTO: 0.61 K/UL (ref 0–0.85)
MONOCYTES NFR BLD AUTO: 5.1 % (ref 0–13.4)
NEUTROPHILS # BLD AUTO: 10.1 K/UL (ref 1.82–7.42)
NEUTROPHILS NFR BLD: 85.1 % (ref 44–72)
NRBC # BLD AUTO: 0 K/UL
NRBC BLD AUTO-RTO: 0 /100 WBC
PLATELET # BLD AUTO: 406 K/UL (ref 164–446)
PMV BLD AUTO: 11.2 FL (ref 9–12.9)
POTASSIUM SERPL-SCNC: 3.3 MMOL/L (ref 3.6–5.5)
RBC # BLD AUTO: 3.58 M/UL (ref 4.7–6.1)
SODIUM SERPL-SCNC: 142 MMOL/L (ref 135–145)
WBC # BLD AUTO: 11.9 K/UL (ref 4.8–10.8)

## 2017-10-23 PROCEDURE — 93306 TTE W/DOPPLER COMPLETE: CPT

## 2017-10-23 PROCEDURE — 80048 BASIC METABOLIC PNL TOTAL CA: CPT

## 2017-10-23 PROCEDURE — 99232 SBSQ HOSP IP/OBS MODERATE 35: CPT | Performed by: FAMILY MEDICINE

## 2017-10-23 PROCEDURE — 700102 HCHG RX REV CODE 250 W/ 637 OVERRIDE(OP): Performed by: HOSPITALIST

## 2017-10-23 PROCEDURE — 700101 HCHG RX REV CODE 250: Performed by: FAMILY MEDICINE

## 2017-10-23 PROCEDURE — 700101 HCHG RX REV CODE 250: Performed by: HOSPITALIST

## 2017-10-23 PROCEDURE — 85025 COMPLETE CBC W/AUTO DIFF WBC: CPT

## 2017-10-23 PROCEDURE — 93005 ELECTROCARDIOGRAM TRACING: CPT | Performed by: HOSPITALIST

## 2017-10-23 PROCEDURE — 94640 AIRWAY INHALATION TREATMENT: CPT

## 2017-10-23 PROCEDURE — 770020 HCHG ROOM/CARE - TELE (206)

## 2017-10-23 PROCEDURE — 36415 COLL VENOUS BLD VENIPUNCTURE: CPT

## 2017-10-23 PROCEDURE — A9270 NON-COVERED ITEM OR SERVICE: HCPCS | Performed by: HOSPITALIST

## 2017-10-23 PROCEDURE — 93306 TTE W/DOPPLER COMPLETE: CPT | Mod: 26 | Performed by: INTERNAL MEDICINE

## 2017-10-23 PROCEDURE — 700111 HCHG RX REV CODE 636 W/ 250 OVERRIDE (IP): Performed by: FAMILY MEDICINE

## 2017-10-23 PROCEDURE — 700111 HCHG RX REV CODE 636 W/ 250 OVERRIDE (IP): Performed by: HOSPITALIST

## 2017-10-23 PROCEDURE — 71010 DX-CHEST-LIMITED (1 VIEW): CPT

## 2017-10-23 PROCEDURE — 82962 GLUCOSE BLOOD TEST: CPT | Mod: 91

## 2017-10-23 RX ORDER — LISINOPRIL 10 MG/1
30 TABLET ORAL DAILY
Status: DISCONTINUED | OUTPATIENT
Start: 2017-10-24 | End: 2017-10-24 | Stop reason: HOSPADM

## 2017-10-23 RX ORDER — FUROSEMIDE 10 MG/ML
40 INJECTION INTRAMUSCULAR; INTRAVENOUS ONCE
Status: ACTIVE | OUTPATIENT
Start: 2017-10-23 | End: 2017-10-24

## 2017-10-23 RX ORDER — LABETALOL HYDROCHLORIDE 5 MG/ML
10 INJECTION, SOLUTION INTRAVENOUS ONCE
Status: COMPLETED | OUTPATIENT
Start: 2017-10-23 | End: 2017-10-23

## 2017-10-23 RX ORDER — IPRATROPIUM BROMIDE AND ALBUTEROL SULFATE 2.5; .5 MG/3ML; MG/3ML
3 SOLUTION RESPIRATORY (INHALATION)
Status: DISCONTINUED | OUTPATIENT
Start: 2017-10-23 | End: 2017-10-24 | Stop reason: HOSPADM

## 2017-10-23 RX ORDER — POTASSIUM CHLORIDE 20 MEQ/1
40 TABLET, EXTENDED RELEASE ORAL ONCE
Status: COMPLETED | OUTPATIENT
Start: 2017-10-23 | End: 2017-10-23

## 2017-10-23 RX ORDER — LABETALOL HYDROCHLORIDE 5 MG/ML
INJECTION, SOLUTION INTRAVENOUS
Status: ACTIVE
Start: 2017-10-23 | End: 2017-10-23

## 2017-10-23 RX ORDER — LEVALBUTEROL INHALATION SOLUTION 1.25 MG/3ML
1.25 SOLUTION RESPIRATORY (INHALATION)
Status: DISCONTINUED | OUTPATIENT
Start: 2017-10-23 | End: 2017-10-24 | Stop reason: HOSPADM

## 2017-10-23 RX ADMIN — IPRATROPIUM BROMIDE AND ALBUTEROL SULFATE 3 ML: .5; 3 SOLUTION RESPIRATORY (INHALATION) at 20:49

## 2017-10-23 RX ADMIN — METHYLPREDNISOLONE SODIUM SUCCINATE 40 MG: 40 INJECTION, POWDER, FOR SOLUTION INTRAMUSCULAR; INTRAVENOUS at 13:17

## 2017-10-23 RX ADMIN — DOXYCYCLINE 100 MG: 100 TABLET ORAL at 10:29

## 2017-10-23 RX ADMIN — CARVEDILOL 12.5 MG: 12.5 TABLET, FILM COATED ORAL at 18:28

## 2017-10-23 RX ADMIN — DOXYCYCLINE 100 MG: 100 TABLET ORAL at 19:55

## 2017-10-23 RX ADMIN — INSULIN LISPRO 2 UNITS: 100 INJECTION, SOLUTION INTRAVENOUS; SUBCUTANEOUS at 13:16

## 2017-10-23 RX ADMIN — HEPARIN SODIUM 5000 UNITS: 5000 INJECTION, SOLUTION INTRAVENOUS; SUBCUTANEOUS at 19:56

## 2017-10-23 RX ADMIN — ENALAPRILAT 1.25 MG: 1.25 INJECTION INTRAVENOUS at 19:56

## 2017-10-23 RX ADMIN — INSULIN LISPRO 3 UNITS: 100 INJECTION, SOLUTION INTRAVENOUS; SUBCUTANEOUS at 06:07

## 2017-10-23 RX ADMIN — IPRATROPIUM BROMIDE AND ALBUTEROL SULFATE 3 ML: .5; 3 SOLUTION RESPIRATORY (INHALATION) at 15:33

## 2017-10-23 RX ADMIN — METHYLPREDNISOLONE SODIUM SUCCINATE 40 MG: 40 INJECTION, POWDER, FOR SOLUTION INTRAMUSCULAR; INTRAVENOUS at 00:17

## 2017-10-23 RX ADMIN — METHYLPREDNISOLONE SODIUM SUCCINATE 40 MG: 40 INJECTION, POWDER, FOR SOLUTION INTRAMUSCULAR; INTRAVENOUS at 06:00

## 2017-10-23 RX ADMIN — LABETALOL HYDROCHLORIDE 10 MG: 5 INJECTION, SOLUTION INTRAVENOUS at 04:33

## 2017-10-23 RX ADMIN — MIRTAZAPINE 30 MG: 30 TABLET, FILM COATED ORAL at 19:55

## 2017-10-23 RX ADMIN — POTASSIUM CHLORIDE 40 MEQ: 1500 TABLET, EXTENDED RELEASE ORAL at 06:00

## 2017-10-23 RX ADMIN — HEPARIN SODIUM 5000 UNITS: 5000 INJECTION, SOLUTION INTRAVENOUS; SUBCUTANEOUS at 10:29

## 2017-10-23 RX ADMIN — CARVEDILOL 12.5 MG: 12.5 TABLET, FILM COATED ORAL at 10:29

## 2017-10-23 RX ADMIN — LISINOPRIL 20 MG: 20 TABLET ORAL at 10:28

## 2017-10-23 RX ADMIN — ENALAPRILAT 1.25 MG: 1.25 INJECTION INTRAVENOUS at 03:42

## 2017-10-23 RX ADMIN — LEVALBUTEROL HYDROCHLORIDE 1.25 MG: 1.25 SOLUTION RESPIRATORY (INHALATION) at 04:42

## 2017-10-23 ASSESSMENT — ENCOUNTER SYMPTOMS
HEARTBURN: 0
BACK PAIN: 0
CHILLS: 0
NECK PAIN: 0
VOMITING: 0
HEADACHES: 0
DIAPHORESIS: 0
FEVER: 0
DIZZINESS: 0
NAUSEA: 0
CLAUDICATION: 0
PHOTOPHOBIA: 0
MYALGIAS: 0
SHORTNESS OF BREATH: 1
BLURRED VISION: 0
TREMORS: 0
DOUBLE VISION: 0
TINGLING: 0

## 2017-10-23 ASSESSMENT — LIFESTYLE VARIABLES: EVER_SMOKED: YES

## 2017-10-23 ASSESSMENT — COPD QUESTIONNAIRES
DO YOU EVER COUGH UP ANY MUCUS OR PHLEGM?: NO/ONLY WITH OCCASIONAL COLDS OR INFECTIONS
HAVE YOU SMOKED AT LEAST 100 CIGARETTES IN YOUR ENTIRE LIFE: YES
DURING THE PAST 4 WEEKS HOW MUCH DID YOU FEEL SHORT OF BREATH: SOME OF THE TIME
COPD SCREENING SCORE: 4

## 2017-10-23 ASSESSMENT — PAIN SCALES - GENERAL
PAINLEVEL_OUTOF10: 0

## 2017-10-23 NOTE — PROGRESS NOTES
Assumed care of PT A&O 4. Pt resting in bed with no signs of labored breathing. On 2L n.c . Tele monitor in place, cardiac rhythm being monitored. Call light within reach, bed in lowest position, upper bed rails up, bed alarm on. Pt was updated on plan of care for the night . Will continue to monitor.

## 2017-10-23 NOTE — PROGRESS NOTES
Respiratory Rapid Response Note    Symptoms SOB, increased O2 demands, tachycardia, HTN.  #SVN Performed: Yes (10/23/17 0444)  Breath Sounds  Pre/Post Intervention: Pre Intervention Assessment (10/23/17 0444)  RUL Breath Sounds: Diminished (10/23/17 0444)  RML Breath Sounds: Diminished (10/23/17 0444)  RLL Breath Sounds: Diminished (10/23/17 0444)  CINDY Breath Sounds: Diminished (10/23/17 0444)  LLL Breath Sounds: Diminished (10/23/17 0444)  Cough:  (no cough at this time ) (10/22/17 1403)   O2 (FiO2): 21 (10/20/17 2325)  O2 (LPM): 2 (10/23/17 0444)  O2 Daily Delivery Respiratory : Silicone Nasal Cannula (10/23/17 0444)    Events/Summary/Plan: PRN Xopenex SVN given for SOB (10/23/17 0444)

## 2017-10-23 NOTE — PROGRESS NOTES
0333 Patient called out to primary RN stating he was short of breath.Primary RN assessed patient immediately and obtained set of vitals. Patient is diaphoretic and pale. Pt 02 is 87% on 2L. 02 increased to 5L. 02 sat increased to 95%.    0336 Pt is hypertensive and tachycardic. PRN vasotec administered by primary RN. MD notified. Orders placed by MD. Plan is to obtain EKG and call RT for treatment at this time. RT George contacted and requested at bedside. RT in route.    0345 EKG being obtained. Pt remains hypertensive, diaphoretic and pale.    0350 RT at bedside. 02 remains at 95%, but labored. Patient HR sustaining in 120s. RT unable to administer treatment at this time due to patients sustained tachycardia. Primary RN paging MD again for orders.     0400 Pt BP is over 200 systollically. Patient remains labored. MD called back and stated to activate rapid response. MD ordered stat chest xray.     0410 Rapid response team at bedside. Awaiting further orders.     0415 Portable CXR tech at bedside.    0430 MD placing further orders. Patients work of breathing improving. Pts tachycardia improving. Meds administered as ordered by provider.     0435 RT administering breathing treatments. Pt states he feels better after breathing treatment. Pt back on 2L.

## 2017-10-23 NOTE — PROGRESS NOTES
Pt resting in bed at this time. Even visible chest rise. No signs of distress. Bed alarm on. Call light in place.Bed in low and locked position.

## 2017-10-23 NOTE — PROGRESS NOTES
[facility] Hospitalist Progress Note    Date of Service: 10/23/2017    Chief Complaint  57 y.o. male admitted 10/20/2017 with ACUTE ON CHRONIC COPD EXACERBATION    Interval Problem Update  NONE    Consultants/Specialty  NONE    Disposition  PENDING        Review of Systems   Constitutional: Negative for chills, diaphoresis and fever.   HENT: Negative for hearing loss and tinnitus.    Eyes: Negative for blurred vision, double vision and photophobia.   Respiratory: Positive for shortness of breath.    Cardiovascular: Negative for chest pain and claudication.   Gastrointestinal: Negative for heartburn, nausea and vomiting.   Genitourinary: Negative for dysuria, frequency and urgency.   Musculoskeletal: Negative for back pain, myalgias and neck pain.   Skin: Negative for itching and rash.   Neurological: Negative for dizziness, tingling, tremors and headaches.      Physical Exam  Laboratory/Imaging   Hemodynamics  Temp (24hrs), Av.7 °C (98.1 °F), Min:36.1 °C (97 °F), Max:37.3 °C (99.1 °F)   Temperature: 37.3 °C (99.1 °F)  Pulse  Av.5  Min: 72  Max: 128    Blood Pressure: 155/99      Respiratory      Respiration: 18, Pulse Oximetry: 97 %, O2 Daily Delivery Respiratory : Silicone Nasal Cannula     Given By:: Mouthpiece, Work Of Breathing / Effort: Mild  RUL Breath Sounds: Diminished, RML Breath Sounds: Diminished, RLL Breath Sounds: Diminished, CINDY Breath Sounds: Diminished, LLL Breath Sounds: Diminished    Fluids    Intake/Output Summary (Last 24 hours) at 10/23/17 1040  Last data filed at 10/22/17 1800   Gross per 24 hour   Intake            779.5 ml   Output                0 ml   Net            779.5 ml       Nutrition  Orders Placed This Encounter   Procedures   • Diet Order     Standing Status:   Standing     Number of Occurrences:   1     Order Specific Question:   Diet:     Answer:   Regular [1]     Comments:   Boost Plus with meals     Physical Exam   Constitutional: He is oriented to person, place, and time.  No distress.   HENT:   Head: Normocephalic and atraumatic.   Eyes: Right eye exhibits no discharge. Left eye exhibits no discharge.   Neck: Neck supple. No JVD present.   Cardiovascular: Normal rate and regular rhythm.    Pulmonary/Chest: No stridor. No respiratory distress.   DIMINISHED BREATH SOUNDS BILATERALLY   Abdominal: Soft. He exhibits no distension. There is no tenderness. There is no rebound.   Musculoskeletal: He exhibits no edema or tenderness.   Neurological: He is alert and oriented to person, place, and time.   Skin: Skin is warm and dry. He is not diaphoretic.       Recent Labs      10/21/17   0332  10/21/17   1818  10/23/17   0248   WBC  8.0  9.8  11.9*   RBC  3.32*  3.39*  3.58*   HEMOGLOBIN  9.2*  9.3*  9.8*   HEMATOCRIT  29.1*  30.3*  31.7*   MCV  87.7  89.4  88.5   MCH  27.7  27.4  27.4   MCHC  31.6*  30.7*  30.9*   RDW  52.3*  55.2*  54.1*   PLATELETCT  441  572*  406   MPV  11.3  10.8  11.2     Recent Labs      10/21/17   0332  10/21/17   1818  10/23/17   0248   SODIUM  139  139  142   POTASSIUM  3.4*  3.8  3.3*   CHLORIDE  108  108  111   CO2  18*  16*  23   GLUCOSE  141*  377*  144*   BUN  18  25*  29*   CREATININE  1.09  1.63*  1.13   CALCIUM  8.4*  8.5  8.2*             Recent Labs      10/21/17   0332   TRIGLYCERIDE  115   HDL  32*   LDL  61          Assessment/Plan     * COPD exacerbation (CMS-HCC)   Assessment & Plan    HAD ANOTHER EPISODE OF RAPID RESPONSE EARLY THIS AM AS PER NURSE'S REPORT AND MOST EFRAIN 2ND TO  ANXIETY  ACUTE ON CHRONIC WITH ACUTE RESP FAILURE  SOLUMEDROL  RESP PROTOCOL  DOXYCYCLINE  WILL CONTINUE OBSERVING  MOST EFRAIN NEEDS O2 AT HOME   CHEST XRAY RESULT IS NOTED AND NO SIGN OF PNEUMONIA         Acute on chronic respiratory failure with hypoxia (CMS-HCC)   Assessment & Plan    Continue with oxygen therapy as needed.          Renal insufficiency   Assessment & Plan    HAS RESOLVED  S/P IV FLUID        Troponin I above reference range   Assessment & Plan    NO  CHEST PAIN  EKG IS REVIEWED WITH NONE SPECIFIC ST CHANGES  TROPS MILDLY ELEVATED  APPEARS TO BE 2ND TO DEMAND ISCHEMIA WITH TACHYCARDIA          High anion gap metabolic acidosis   Assessment & Plan    ?starvation ketosis, as patient appears cachectic.  Will check urinalysis.         Normocytic anemia   Assessment & Plan    Currently stable, however will monitor.  Patient reports taking 12-15 tablets of ibuprofen daily for last several days.  He is unaware of any black or tarry stools as he has a vision deficit.  Given hypoxia, concern for possible cause.  Transfuse if needed for hemoglobin less than 7 gm/dL  F/U WITH PCP AS AN OUTPT FOR GI REFERAL          Severe protein-calorie malnutrition (CMS-HCC)   Assessment & Plan    Encourage oral intake.    PRE-ALBUMIN  MODERATE   dietary consultation        Cachexia (CMS-HCC)   Assessment & Plan    Likely due to chronic disease.          Essential hypertension   Assessment & Plan    NOT WELL CONTROLLED COREG  INCREASED LISINIOPRIL            Cabral catheter::  No Cabral  DVT prophylaxis pharmacological::  Heparin

## 2017-10-23 NOTE — CARE PLAN
Problem: Safety  Goal: Will remain free from falls  Outcome: PROGRESSING AS EXPECTED  Pt mobility assessed at beginning of shift. Pt is a 1 assist. Fall precautions in place. Non-slip socks on. Bed in lowest locked position. Bed alarm on. Call light within reach. Pt educated to call for assistance and verbalizes understanding.    Problem: Respiratory:  Goal: Respiratory status will improve  Outcome: PROGRESSING AS EXPECTED  Pt on  machine satting at 95% on 2L n.c.

## 2017-10-23 NOTE — PROGRESS NOTES
Received report and assumed care of patient. Patient is alert and oriented x4. Patient is in no signs of distress and denies pain at this time. Patient was updated on the plan of care for the day. Patient hoping to go home today. Rapid response called around 0300 this am for patient having SOB and feeling like he can't breathe. Symptoms have resolved. Fluids on hold per MD until rounds. Call light within reach, bed in low position, 2 side rails up. Educated on fall risk, verbalizes understanding. Will continue to monitor

## 2017-10-23 NOTE — CODE DOCUMENTATION
Pt A+OX4. Able to carry on full conversation with minimal work of breathing. 5L NC 93%. Lungs diminished. labatolol push given at this time

## 2017-10-23 NOTE — FLOWSHEET NOTE
10/22/17 2049   Events/Summary/Plan   Non-Invasive Resp Device Site Inspection Completed Intact   Interdisciplinary Plan of Care-Goals (Indications)   Obstructive Ventilatory Defect or Pulmonary Disease without Obvious Obstruction History / Diagnosis   Interdisciplinary Plan of Care-Outcomes    Bronchodilator Outcome Patient at Stable Baseline   Education   Education Yes - Pt. / Family has been Instructed in use of Respiratory Equipment   RT Assessment of Delivered Medications   Evaluation of Medication Delivery Daily Yes-- Pt /Family has been Instructed in use of Respiratory Medications and Adverse Reactions   SVN Group   #SVN Performed Yes   Given By: Mouthpiece   Respiratory WDL   Respiratory (WDL) X   Chest Exam   Work Of Breathing / Effort Mild   Respiration 16   Pulse 90   Breath Sounds   Pre/Post Intervention Pre Intervention Assessment   RUL Breath Sounds Diminished   RML Breath Sounds Diminished   RLL Breath Sounds Diminished   CINDY Breath Sounds Diminished   LLL Breath Sounds Diminished   Oximetry   Continuous Oximetry Yes   Oxygen   Pulse Oximetry 95 %   O2 (LPM) 2   O2 Daily Delivery Respiratory  Silicone Nasal Cannula

## 2017-10-24 ENCOUNTER — PATIENT OUTREACH (OUTPATIENT)
Dept: HEALTH INFORMATION MANAGEMENT | Facility: OTHER | Age: 57
End: 2017-10-24

## 2017-10-24 VITALS
SYSTOLIC BLOOD PRESSURE: 167 MMHG | OXYGEN SATURATION: 92 % | WEIGHT: 110.23 LBS | RESPIRATION RATE: 18 BRPM | DIASTOLIC BLOOD PRESSURE: 99 MMHG | BODY MASS INDEX: 16.71 KG/M2 | TEMPERATURE: 98.2 F | HEIGHT: 68 IN | HEART RATE: 86 BPM

## 2017-10-24 PROBLEM — I50.20 SYSTOLIC CHF (HCC): Status: ACTIVE | Noted: 2017-10-24

## 2017-10-24 PROBLEM — J96.21 ACUTE ON CHRONIC RESPIRATORY FAILURE WITH HYPOXIA (HCC): Status: RESOLVED | Noted: 2017-10-20 | Resolved: 2017-10-24

## 2017-10-24 PROBLEM — J44.1 COPD EXACERBATION (HCC): Status: RESOLVED | Noted: 2017-10-20 | Resolved: 2017-10-24

## 2017-10-24 PROBLEM — R79.89 TROPONIN I ABOVE REFERENCE RANGE: Status: RESOLVED | Noted: 2017-10-20 | Resolved: 2017-10-24

## 2017-10-24 PROBLEM — E87.29 HIGH ANION GAP METABOLIC ACIDOSIS: Status: RESOLVED | Noted: 2017-10-20 | Resolved: 2017-10-24

## 2017-10-24 PROBLEM — N28.9 RENAL INSUFFICIENCY: Status: RESOLVED | Noted: 2017-10-23 | Resolved: 2017-10-24

## 2017-10-24 LAB
ANION GAP SERPL CALC-SCNC: 10 MMOL/L (ref 0–11.9)
BASOPHILS # BLD AUTO: 0.1 % (ref 0–1.8)
BASOPHILS # BLD: 0.01 K/UL (ref 0–0.12)
BUN SERPL-MCNC: 36 MG/DL (ref 8–22)
CALCIUM SERPL-MCNC: 8.5 MG/DL (ref 8.5–10.5)
CHLORIDE SERPL-SCNC: 109 MMOL/L (ref 96–112)
CO2 SERPL-SCNC: 21 MMOL/L (ref 20–33)
CREAT SERPL-MCNC: 0.99 MG/DL (ref 0.5–1.4)
EOSINOPHIL # BLD AUTO: 0 K/UL (ref 0–0.51)
EOSINOPHIL NFR BLD: 0 % (ref 0–6.9)
ERYTHROCYTE [DISTWIDTH] IN BLOOD BY AUTOMATED COUNT: 54.5 FL (ref 35.9–50)
GFR SERPL CREATININE-BSD FRML MDRD: >60 ML/MIN/1.73 M 2
GLUCOSE BLD-MCNC: 150 MG/DL (ref 65–99)
GLUCOSE BLD-MCNC: 154 MG/DL (ref 65–99)
GLUCOSE BLD-MCNC: 156 MG/DL (ref 65–99)
GLUCOSE SERPL-MCNC: 139 MG/DL (ref 65–99)
HCT VFR BLD AUTO: 30.5 % (ref 42–52)
HGB BLD-MCNC: 9.5 G/DL (ref 14–18)
IMM GRANULOCYTES # BLD AUTO: 0.06 K/UL (ref 0–0.11)
IMM GRANULOCYTES NFR BLD AUTO: 0.7 % (ref 0–0.9)
LYMPHOCYTES # BLD AUTO: 1.37 K/UL (ref 1–4.8)
LYMPHOCYTES NFR BLD: 16.1 % (ref 22–41)
MCH RBC QN AUTO: 28.3 PG (ref 27–33)
MCHC RBC AUTO-ENTMCNC: 31.1 G/DL (ref 33.7–35.3)
MCV RBC AUTO: 90.8 FL (ref 81.4–97.8)
MONOCYTES # BLD AUTO: 0.52 K/UL (ref 0–0.85)
MONOCYTES NFR BLD AUTO: 6.1 % (ref 0–13.4)
NEUTROPHILS # BLD AUTO: 6.55 K/UL (ref 1.82–7.42)
NEUTROPHILS NFR BLD: 77 % (ref 44–72)
NRBC # BLD AUTO: 0 K/UL
NRBC BLD AUTO-RTO: 0 /100 WBC
PLATELET # BLD AUTO: 328 K/UL (ref 164–446)
PMV BLD AUTO: 11.5 FL (ref 9–12.9)
POTASSIUM SERPL-SCNC: 3.6 MMOL/L (ref 3.6–5.5)
RBC # BLD AUTO: 3.36 M/UL (ref 4.7–6.1)
SODIUM SERPL-SCNC: 140 MMOL/L (ref 135–145)
WBC # BLD AUTO: 8.5 K/UL (ref 4.8–10.8)

## 2017-10-24 PROCEDURE — 94640 AIRWAY INHALATION TREATMENT: CPT

## 2017-10-24 PROCEDURE — A9270 NON-COVERED ITEM OR SERVICE: HCPCS | Performed by: FAMILY MEDICINE

## 2017-10-24 PROCEDURE — 36415 COLL VENOUS BLD VENIPUNCTURE: CPT

## 2017-10-24 PROCEDURE — 700101 HCHG RX REV CODE 250: Performed by: FAMILY MEDICINE

## 2017-10-24 PROCEDURE — 700102 HCHG RX REV CODE 250 W/ 637 OVERRIDE(OP): Performed by: HOSPITALIST

## 2017-10-24 PROCEDURE — 82962 GLUCOSE BLOOD TEST: CPT

## 2017-10-24 PROCEDURE — 700111 HCHG RX REV CODE 636 W/ 250 OVERRIDE (IP): Performed by: HOSPITALIST

## 2017-10-24 PROCEDURE — 700102 HCHG RX REV CODE 250 W/ 637 OVERRIDE(OP): Performed by: FAMILY MEDICINE

## 2017-10-24 PROCEDURE — 99239 HOSP IP/OBS DSCHRG MGMT >30: CPT | Performed by: INTERNAL MEDICINE

## 2017-10-24 PROCEDURE — 700111 HCHG RX REV CODE 636 W/ 250 OVERRIDE (IP): Performed by: FAMILY MEDICINE

## 2017-10-24 PROCEDURE — A9270 NON-COVERED ITEM OR SERVICE: HCPCS | Performed by: INTERNAL MEDICINE

## 2017-10-24 PROCEDURE — 85025 COMPLETE CBC W/AUTO DIFF WBC: CPT

## 2017-10-24 PROCEDURE — A9270 NON-COVERED ITEM OR SERVICE: HCPCS | Performed by: HOSPITALIST

## 2017-10-24 PROCEDURE — 700102 HCHG RX REV CODE 250 W/ 637 OVERRIDE(OP): Performed by: INTERNAL MEDICINE

## 2017-10-24 PROCEDURE — 80048 BASIC METABOLIC PNL TOTAL CA: CPT

## 2017-10-24 RX ORDER — BUDESONIDE AND FORMOTEROL FUMARATE DIHYDRATE 160; 4.5 UG/1; UG/1
2 AEROSOL RESPIRATORY (INHALATION) 2 TIMES DAILY
Qty: 1 INHALER | Refills: 2 | Status: SHIPPED | OUTPATIENT
Start: 2017-10-24

## 2017-10-24 RX ORDER — ALBUTEROL SULFATE 90 UG/1
2 AEROSOL, METERED RESPIRATORY (INHALATION) EVERY 6 HOURS PRN
Qty: 8.5 G | Refills: 2 | Status: SHIPPED | OUTPATIENT
Start: 2017-10-24

## 2017-10-24 RX ORDER — FUROSEMIDE 20 MG/1
20 TABLET ORAL
Status: DISCONTINUED | OUTPATIENT
Start: 2017-10-24 | End: 2017-10-24 | Stop reason: HOSPADM

## 2017-10-24 RX ORDER — LABETALOL HYDROCHLORIDE 5 MG/ML
10 INJECTION, SOLUTION INTRAVENOUS EVERY 6 HOURS PRN
Status: DISCONTINUED | OUTPATIENT
Start: 2017-10-24 | End: 2017-10-24 | Stop reason: HOSPADM

## 2017-10-24 RX ORDER — FUROSEMIDE 20 MG/1
20 TABLET ORAL DAILY
Qty: 60 TAB | Refills: 1 | Status: SHIPPED | OUTPATIENT
Start: 2017-10-24

## 2017-10-24 RX ORDER — PREDNISONE 20 MG/1
TABLET ORAL
Qty: 20 TAB | Refills: 0 | Status: SHIPPED | OUTPATIENT
Start: 2017-10-24

## 2017-10-24 RX ORDER — IPRATROPIUM BROMIDE AND ALBUTEROL SULFATE 2.5; .5 MG/3ML; MG/3ML
3 SOLUTION RESPIRATORY (INHALATION) 4 TIMES DAILY
Qty: 10 BULLET | Refills: 3 | Status: SHIPPED | OUTPATIENT
Start: 2017-10-24

## 2017-10-24 RX ORDER — DOXYCYCLINE 100 MG/1
100 TABLET ORAL EVERY 12 HOURS
Qty: 2 TAB | Refills: 0 | Status: SHIPPED | OUTPATIENT
Start: 2017-10-24 | End: 2017-10-25

## 2017-10-24 RX ADMIN — DOXYCYCLINE 100 MG: 100 TABLET ORAL at 09:23

## 2017-10-24 RX ADMIN — IPRATROPIUM BROMIDE AND ALBUTEROL SULFATE 3 ML: .5; 3 SOLUTION RESPIRATORY (INHALATION) at 10:00

## 2017-10-24 RX ADMIN — IPRATROPIUM BROMIDE AND ALBUTEROL SULFATE 3 ML: .5; 3 SOLUTION RESPIRATORY (INHALATION) at 06:36

## 2017-10-24 RX ADMIN — FUROSEMIDE 20 MG: 20 TABLET ORAL at 13:45

## 2017-10-24 RX ADMIN — HEPARIN SODIUM 5000 UNITS: 5000 INJECTION, SOLUTION INTRAVENOUS; SUBCUTANEOUS at 09:23

## 2017-10-24 RX ADMIN — CARVEDILOL 12.5 MG: 12.5 TABLET, FILM COATED ORAL at 09:23

## 2017-10-24 RX ADMIN — INSULIN LISPRO 2 UNITS: 100 INJECTION, SOLUTION INTRAVENOUS; SUBCUTANEOUS at 06:40

## 2017-10-24 RX ADMIN — ENALAPRILAT 1.25 MG: 1.25 INJECTION INTRAVENOUS at 04:48

## 2017-10-24 RX ADMIN — IPRATROPIUM BROMIDE AND ALBUTEROL SULFATE 3 ML: .5; 3 SOLUTION RESPIRATORY (INHALATION) at 14:58

## 2017-10-24 RX ADMIN — LISINOPRIL 30 MG: 10 TABLET ORAL at 09:23

## 2017-10-24 RX ADMIN — ENALAPRILAT 1.25 MG: 1.25 INJECTION INTRAVENOUS at 03:30

## 2017-10-24 RX ADMIN — INSULIN LISPRO 2 UNITS: 100 INJECTION, SOLUTION INTRAVENOUS; SUBCUTANEOUS at 13:45

## 2017-10-24 ASSESSMENT — PAIN SCALES - GENERAL
PAINLEVEL_OUTOF10: 0

## 2017-10-24 NOTE — PROGRESS NOTES
Pt refusing to eat his lunch, only wants milkshake. Reinforced education on importance of getting enough protein and calories to stay healthy, prevent future hospital admissions. RN talked with pt for several minutes about what he likes, and offered snacks of all kind, offered him to eat anything, he still refuses. Patient says he will eat [employer] on the way home, and says he will eat at home, just doesn't want to eat now.

## 2017-10-24 NOTE — DISCHARGE SUMMARY
CHIEF COMPLAINT ON ADMISSION  Chief Complaint   Patient presents with   • Shortness of Breath     Transfer from [facility] for early PNA and possible sepsis       CODE STATUS  Full Code    HPI & HOSPITAL COURSE  This is a 57 y.o. male here with shortness of breath and for further evaluation of this issue. Patient was admitted to the telemetry floor and placed on nebulizer treatments along with oral doxycycline and steroids given initial concern for COPD exacerbation. He did somewhat improve with this, but an ECHO was performed that showed mild grade II diastolic dysfunction along with an EF of 35-40% showing mild impairment of the left ventricle. He was placed on mild diuretics and treated for both conditions simultaneously and did improve during this hospital admission. His oxygen was weaned down and he remained afebrile. He will require 1-2 liters of oxygen at home. As for the etiology of his heart failure, most likely is ischemic related and will need further work up with outpatient cardiologist which he understands. He is deemed medically stable for transfer.  As outlined below, he will finish a prednisone taper at home. His ECHO also showed severe pulmonary HTN which is likely a reflection of long standing COPD as well. Might benefit from a right sided heart catheterization in the near future.    Therefore, he is discharged in fair and stable condition with close outpatient follow-up.    SPECIFIC OUTPATIENT FOLLOW-UP  -F/U with his PCP and establish care with a cardiologist in the next few weeks    DISCHARGE PROBLEM LIST  Principal Problem (Resolved):    COPD exacerbation (CMS-[facility]) POA: Yes  Active Problems:    Essential hypertension POA: Yes    Cachexia (CMS-[facility]) POA: Yes    Severe protein-calorie malnutrition (CMS-[facility]) POA: Yes    Normocytic anemia POA: Yes    Systolic CHF (CMS-[facility]) POA: Yes  Resolved Problems:    Acute on chronic respiratory failure with hypoxia (CMS-[facility]) POA: Yes    High anion gap metabolic  acidosis POA: Yes    Troponin I above reference range POA: Yes    Renal insufficiency POA: Yes      Overview: HAS RESOLVED      S/P IV FLUID      FOLLOW UP  No future appointments.  S Chris Gibson M.D.  [address] [place] [id number]  [phone]    Go on 11/1/2017  Please arrive at 4:20 pm for your appointment. Thank you.      MEDICATIONS ON DISCHARGE   Scott Matthews   [facility] Medication Instructions BRIAN:[id number]    Printed on:10/24/17 7970   Medication Information                      albuterol 108 (90 Base) MCG/ACT Aero Soln inhalation aerosol  Inhale 2 Puffs by mouth every 6 hours as needed for Shortness of Breath.             budesonide-formoterol (SYMBICORT) 160-4.5 MCG/ACT Aerosol  Inhale 2 Puffs by mouth 2 Times a Day.             carvedilol (COREG) 12.5 MG Tab  Take 12.5 mg by mouth 2 times a day, with meals.             doxycycline monohydrate (ADOXA) 100 MG tablet  Take 1 Tab by mouth every 12 hours for 1 day.             furosemide (LASIX) 20 MG Tab  Take 1 Tab by mouth every day.             ipratropium-albuterol (DUONEB) 0.5-2.5 (3) MG/3ML nebulizer solution  3 mL by Nebulization route 4 times a day.             lisinopril (PRINIVIL) 10 MG Tab  Take 20 mg by mouth every day. INCREASED TO 20 MG 10-16-17             mirtazapine (REMERON) 30 MG Tab tablet  Take 30 mg by mouth every bedtime.             predniSONE (DELTASONE) 20 MG Tab  Take 30 mg qd X3 days, 20 mg qd X 3days, 10 mg qd X3 days, 5 mg qd X3 days, then stop.                 DIET  Orders Placed This Encounter   Procedures   • Diet Order     Standing Status:   Standing     Number of Occurrences:   1     Order Specific Question:   Diet:     Answer:   Regular [1]     Comments:   Boost Plus with meals       ACTIVITY  As tolerated.  Weight bearing as tolerated      CONSULTATIONS  -none    PROCEDURES  ECHOCARDIOGRAM-COMP W/ CONT   Final Result      DX-CHEST-LIMITED (1 VIEW)   Final Result      Bibasilar atelectasis, equivocally worse  compared with 10/21.               INTERPRETING LOCATION: [address] [place] [address] [place], [address]      DX-CHEST-PORTABLE (1 VIEW)   Final Result      1.  Mild bibasilar atelectasis, worse on the LEFT.  Developing pneumonia is difficult to exclude.   2.  No pleural fluid or pneumothorax.      DX-CHEST-2 VIEWS   Final Result      1.  Opacification in right lung apex is again noted which could be due to scarring. Infection or neoplasm is also possible. There is no significant change.      2.  No new abnormalities are identified.      DX-CHEST-LIMITED (1 VIEW)   Final Result         Ill-defined opacity in the left lung apex could relate to infection.      OUTSIDE IMAGES-NUCLEAR MEDICINE   Final Result      OUTSIDE IMAGES-DX CHEST   Final Result      [id number] FILM X-RAY   Final Result      OUTSIDE IMAGES-DX CHEST   Final Result          ECHO-  Moderately reduced left ventricular systolic function.  Left ventricular ejection fraction is visually estimated to be 35% to   40%.  Grade II diastolic dysfunction.  Moderate to severe mitral regurgitation.  Right ventricular systolic pressure is estimated to be 70  mmHg.  Right heart pressures are consistent with severe pulmonary   hypertension.    LABORATORY  Lab Results   Component Value Date/Time    SODIUM 140 10/24/2017 04:48 AM    POTASSIUM 3.6 10/24/2017 04:48 AM    CHLORIDE 109 10/24/2017 04:48 AM    CO2 21 10/24/2017 04:48 AM    GLUCOSE 139 (H) 10/24/2017 04:48 AM    BUN 36 (H) 10/24/2017 04:48 AM    CREATININE 0.99 10/24/2017 04:48 AM        Lab Results   Component Value Date/Time    WBC 8.5 10/24/2017 04:48 AM    HEMOGLOBIN 9.5 (L) 10/24/2017 04:48 AM    HEMATOCRIT 30.5 (L) 10/24/2017 04:48 AM    PLATELETCT 328 10/24/2017 04:48 AM        Total time of the discharge process exceeds 40 minutes

## 2017-10-24 NOTE — CARE PLAN
Problem: Safety  Goal: Will remain free from injury    Intervention: Provide assistance with mobility  Bed alarm on, bed locked in low position, treaded socks on, call light and items within reach. Hourly rounding in place. Assessed mobility and communicated with CNA and patient regarding risk of fall.   Assisted pt to and from restroom and up to edge of bed for meals as the patient has limited sight ability.       Problem: Respiratory:  Goal: Respiratory status will improve    Intervention: Educate and encourage incentive spirometry usage  Encouraged use of IS, coughing and deep breathing. Ensured Oxygen saturation was an an acceptable level

## 2017-10-24 NOTE — CARE PLAN
Problem: Oxygenation:  Goal: Maintain adequate oxygenation dependent on patient condition    Intervention: Manage oxygen therapy by monitoring pulse oximetry and/or ABG values  PT is on 2LPM SPO2 is 96%

## 2017-10-24 NOTE — DISCHARGE INSTRUCTIONS
Discharge Instructions    Discharged to home by car with relative. Discharged via wheelchair, hospital escort: Yes.  Special equipment needed: Not Applicable    Be sure to schedule a follow-up appointment with your primary care doctor or any specialists as instructed.     Discharge Plan:   Diet Plan: Discussed  Activity Level: Discussed  Confirmed Follow up Appointment: Appointment Scheduled  Confirmed Symptoms Management: Discussed  Medication Reconciliation Updated: Yes  Influenza Vaccine Indication: Not indicated: Previously immunized this influenza season and > 8 years of age  Influenza Vaccine Given - only chart on this line when given: Influenza Vaccine Given (See MAR)    I understand that a diet low in cholesterol, fat, and sodium is recommended for good health. Unless I have been given specific instructions below for another diet, I accept this instruction as my diet prescription.   Other diet:     Special Instructions:   HF Patient Discharge Instructions  · Monitor your weight daily, and maintain a weight chart, to track your weight changes.   · Activity as tolerated, unless your Doctor has ordered otherwise. Other activity order: short walks with rest periods.  · Follow a low fat, low cholesterol, low salt diet unless instructed otherwise by your Doctor. Read the labels on the back of food products and track your intake of fat, cholesterol and salt.   · Fluid Restriction No. If a Fluid Restriction has been ordered by your Doctor, measure fluids with a measuring cup to ensure that you are not exceeding the restriction.   · No smoking.  · Oxygen Yes. If your Doctor has ordered that you wear Oxygen at home, it is important to wear it as ordered.  · Did you receive an explanation from staff on the importance of taking each of your medications and why it is necessary to keep taking them unless your doctor says to stop? Yes  · Were all of your questions answered about how to manage your heart failure and what to  do if you have increased signs and symptoms after you go home? Yes  · Do you feel like your heart failure care team involved you in the care treatment plan and allowed you to make decisions regarding your care while in the hospital and addressed any discharge needs you might have? Yes    See the educational handout provided at discharge for more information on monitoring your daily weight, activity and diet. This also explains more about Heart Failure, symptoms of a flare-up and some of the tests that you have undergone.     Warning Signs of a Flare-Up include:  · Swelling in the ankles or lower legs.  · Shortness of breath, while at rest, or while doing normal activities.   · Shortness of breath at night when in bed, or coughing in bed.   · Requiring more pillows to sleep at night, or needing to sit up at night to sleep.  · Feeling weak, dizzy or fatigued.     When to call your Doctor:  · Call [facility] seven days a week from 8:00 a.m. to 8:00 p.m. for medical questions [phone].  · Call your Primary Care Physician or Cardiologist if:   1. You experience any pain radiating to your jaw or neck.  2. You have any difficulty breathing.  3. You experience weight gain of 3 lbs in a day or 5 lbs in a week.   4. You feel any palpitations or irregular heartbeats.  5. You become dizzy or lose consciousness.   If you have had an angiogram or had a pacemaker or AICD placed, and experience:  1. Bleeding, drainage or swelling at the surgical / puncture site.  2. Fever greater than 100.0 F  3. Shock from internal defibrillator.  4. Cool and / or numb extremities.      · Is patient discharged on Warfarin / Coumadin?   No     · Is patient Post Blood Transfusion?  No    Depression / Suicide Risk    As you are discharged from this [facility], it is important to learn how to keep safe from harming yourself.    Recognize the warning signs:  · Abrupt changes in personality, positive or negative- including  increase in energy   · Giving away possessions  · Change in eating patterns- significant weight changes-  positive or negative  · Change in sleeping patterns- unable to sleep or sleeping all the time   · Unwillingness or inability to communicate  · Depression  · Unusual sadness, discouragement and loneliness  · Talk of wanting to die  · Neglect of personal appearance   · Rebelliousness- reckless behavior  · Withdrawal from people/activities they love  · Confusion- inability to concentrate     If you or a loved one observes any of these behaviors or has concerns about self-harm, here's what you can do:  · Talk about it- your feelings and reasons for harming yourself  · Remove any means that you might use to hurt yourself (examples: pills, rope, extension cords, firearm)  · Get professional help from the community (Mental Health, Substance Abuse, psychological counseling)  · Do not be alone:Call your Safe Contact- someone whom you trust who will be there for you.  · Call your local CRISIS HOTLINE 852-4336 or 719-326-5332  · Call your local Children's Mobile Crisis Response Team [place] (526) 693-2857 or wwwAcertiv  · Call the toll free National Suicide Prevention Hotlines   · National Suicide Prevention Lifeline 120-167-NLSD (1317)  · National Hope Line Network 800-SUICIDE (576-9236)      Chronic Obstructive Pulmonary Disease  Chronic obstructive pulmonary disease (COPD) is a common lung problem. In COPD, the flow of air from the lungs is limited. The way your lungs work will probably never return to normal, but there are things you can do to improve your lungs and make yourself feel better. Your doctor may treat your condition with:  · Medicines.  · Oxygen.  · Lung surgery.  · Changes to your diet.  · Rehabilitation. This may involve a team of specialists.  HOME CARE  · Take all medicines as told by your doctor.  · Avoid medicines or cough syrups that dry up your airway (such as antihistamines) and do  not allow you to get rid of thick spit. You do not need to avoid them if told differently by your doctor.  · If you smoke, stop. Smoking makes the problem worse.  · Avoid being around things that make your breathing worse (like smoke, chemicals, and fumes).  · Use oxygen therapy and therapy to help improve your lungs (pulmonary rehabilitation) if told by your doctor. If you need home oxygen therapy, ask your doctor if you should buy a tool to measure your oxygen level (oximeter).  · Avoid people who have a sickness you can catch (contagious).  · Avoid going outside when it is very hot, cold, or humid.  · Eat healthy foods. Eat smaller meals more often. Rest before meals.  · Stay active, but remember to also rest.  · Make sure to get all the shots (vaccines) your doctor recommends. Ask your doctor if you need a pneumonia shot.  · Learn and use tips on how to relax.  · Learn and use tips on how to control your breathing as told by your doctor. Try:  ¨ Breathing in (inhaling) through your nose for 1 second. Then, pucker your lips and breath out (exhale) through your lips for 2 seconds.  ¨ Putting one hand on your belly (abdomen). Breathe in slowly through your nose for 1 second. Your hand on your belly should move out. Pucker your lips and breathe out slowly through your lips. Your hand on your belly should move in as you breathe out.  · Learn and use controlled coughing to clear thick spit from your lungs. The steps are:  3. Lean your head a little forward.  4. Breathe in deeply.  5. Try to hold your breath for 3 seconds.  6. Keep your mouth slightly open while coughing 2 times.  7. Spit any thick spit out into a tissue.  8. Rest and do the steps again 1 or 2 times as needed.  GET HELP IF:  · You cough up more thick spit than usual.  · There is a change in the color or thickness of the spit.  · It is harder to breathe than usual.  · Your breathing is faster than usual.  GET HELP RIGHT AWAY IF:  · You have shortness of  breath while resting.  · You have shortness of breath that stops you from:  ¨ Being able to talk.  ¨ Doing normal activities.  · You chest hurts for longer than 5 minutes.  · Your skin color is more blue than usual.  · Your pulse oximeter shows that you have low oxygen for longer than 5 minutes.  MAKE SURE YOU:  · Understand these instructions.  · Will watch your condition.  · Will get help right away if you are not doing well or get worse.     This information is not intended to replace advice given to you by your health care provider. Make sure you discuss any questions you have with your health care provider.     Document Released: 06/05/2009 Document Revised: 01/08/2016 Document Reviewed: 08/14/2014  [employer] Interactive Patient Education ©2016 [employer]

## 2017-10-24 NOTE — PROGRESS NOTES
Lisset, daughter called. Per Lisset the daughter in law can   patient, just need about 2 hours notice. The patient's oxygen company called her and stated a portable tank will be delivered to the hospital here at [facility], and a compressor will be delivered to the patient's house. RN explained to Lisset receiving the O2 tank can take several hours and may even take all day, she verbally understands and says that is fine.   RN to call Lisset when pt is almost ready for d/c, and she will have the daughter in law drive to [place] to pick him up.

## 2017-10-24 NOTE — CARE PLAN
Problem: Safety  Goal: Will remain free from falls  Outcome: PROGRESSING AS EXPECTED  Pt mobility assessed at beginning of shift. Pt is a 1  assist. Fall precautions in place. Non-slip socks on. Bed in lowest locked position. Bed alarm on. Call light within reach. Pt educated to call for assistance and verbalizes understanding.    Problem: Respiratory:  Goal: Respiratory status will improve  Outcome: PROGRESSING AS EXPECTED  Pt on 2L n.c. Continuos pulse ox machine in use O2 sat at 95%. RT on board.

## 2017-10-24 NOTE — PROGRESS NOTES
Bedside report completed with noc RN. Pt resting, denies pain. Bed locked in low position, call light within reach. Bed alarm on. Reviewed plan of care with noc RN and pt. Patient has no questions at this time.   Overnight uneventful, except Blood pressures elevated, highest 173/107, required two doses of vasotec that brought it down to 164/104.   Pt denies pain, sob.

## 2017-10-24 NOTE — PROGRESS NOTES
Assumed care of Pt A&O x  4. Pt resting in bed with no signs of labored breathing. On 2l n.c satting at 95%.  machine in use. Tele monitor in place, cardiac rhythm being monitored. Call light within reach, bed in lowest position, upper bed rails up, bed alarm on. Pt was updated on plan of care for the night . Will continue to monitor.

## 2017-10-24 NOTE — PROGRESS NOTES
Pts BP is 173/107. HR 94.  Dr. Dinero notified of pt's BP being this high and vasotec not being able to be given since it has been less than 6 hours from previous administration. Per MD PRN labetalol has been added only if SBP >180.  Will continue to monitor.

## 2017-10-24 NOTE — HEART FAILURE PROGRAM
Cardiovascular Nurse Navigator () Progress Note:    This [employer] received a call from Bedside RN, Marlyn. Marlyn is concerned that pt may be HF as he was given IV furosemide and is going home on it.     Echo shows grade II diastolic dysfunction and EF of 35-40%. HF is not on problems list nor is it mentioned in hospitalist note yesterday. No BNP was drawn    Advised Marlyn to speak with hospitalist to inquire as to the question of HF diagnosis and to not educate the patient about HF until the MD has confirmed.    Just in case, this [employer] spoke with hospital , Carmela. Pt has a PCP in [place], she will try to get him scheduled there for follow up.     Because patient has [employer] coverage, he can not be seen by [facility] for f/u.    Thank you and please call with questions, Marlyn

## 2017-10-24 NOTE — PROGRESS NOTES
Pt resting in bed at this time. Even visible chest rise. No signs of distress. Bed alarm on. Call light in place. On 2 L n.c satting at 96%. Bed in low and locked position.

## 2017-10-24 NOTE — CARE PLAN
Problem: Safety  Goal: Will remain free from injury  Outcome: PROGRESSING AS EXPECTED  Pt assessed for fall risk. Proper precautions in place. Pt educated to call for assistance, verbalizes understanding. Treaded slipper socks in place, bed alarm on, bed in low position, wheels locked, side rails up x2, call light within reach.     Problem: Infection  Goal: Will remain free from infection  Outcome: PROGRESSING AS EXPECTED  Pt monitored for signs and symptoms of infection. Vitals and labs assessed and monitored for signs of infection. Proper hand hygiene performed prior to entering and exiting pt's room. Pt educated on proper hand hygiene. PO abx for patient.

## 2017-10-25 ENCOUNTER — PATIENT OUTREACH (OUTPATIENT)
Dept: HEALTH INFORMATION MANAGEMENT | Facility: OTHER | Age: 57
End: 2017-10-25

## 2017-10-25 NOTE — PROGRESS NOTES
Pt discharged, all PIVs removed, vitals stable. Thoroughly went over discharge instructions with the patient and the daughter. Went over Heart failure education focusing on taking medications regularly, low salt diet and daily weights. Explained how to walk the pt through an anxiety attack. Went over signs of stroke and heart attack, when to call 911 when to call MD. Paperwork signed. All questions answered. Follow up appointments set and pt understands. All belongings with patient. And family.

## 2017-10-25 NOTE — PROGRESS NOTES
Placed discharge outreach phone call to patient s/p hospital discharge 10/24/17.  Left message with Lenny (employee of pt's mother), providing my contact information and instructions for pt to call with any questions or concerns.

## 2017-10-31 NOTE — DOCUMENTATION QUERY
DOCUMENTATION QUERY     PROVIDERS: Please select “Cosign w/ note”to reply to query.     To better represent the severity of illness of your patient, please review the following information and exercise your independent professional judgment in responding to this query.      The H&P documents that in the ER at another hospital prior to transfer, the patient had an elevated lactic acid level and was treated for sepsis.  Transferred for early pneumonia and possible sepsis.  Sepsis isn’t documented elsewhere.  Based upon the clinical findings, risk factors, and treatment, can you clarify if sepsis was present on admission or ruled out:          • Sepsis was present on admission  • Sepsis was ruled out  • Unable to determine           The medical record reflects the following:   Clinical Findings H&P: Temp (24hrs), Av.8 °C (98.2 °F), Min:36.8 °C (98.2 °F), Max:36.8 °C (98.2 °F)  Temperature: 36.8 °C (98.2 °F)  Pulse Av.5 Min: 95 Max: 98 Heart Rate (Monitored): 96  Blood Pressure: (!) 175/97, NIBP: 160/84   Respiration: 18, Pulse Oximetry: 96 %   WBC: 7.9   Treatment Steroids, antibiotics, oxygen   Risk Factors Acute respiratory failure with hypoxia, diastolic heart failure, COPD exacerbation    Location within medical record H&P, Discharge Summary      Thank you,   Alisa Antonio, CCS, CCS-P  [email or web address]

## 2017-10-31 NOTE — DOCUMENTATION QUERY
DOCUMENTATION QUERY     PROVIDERS: Please select “Cosign w/ note”to reply to query.     To better represent the severity of illness of your patient, please review the following information and exercise your independent professional judgment in responding to this query.      Systolic CHF is documented in the Discharge Summary.  Based upon the clinical findings, risk factors, and treatment, can you clarify acuity of CHF and if present on admission or developed after admission.         ·         Chronic systolic congestive heart failure  (please specify if POA)  ·         Acute systolic congestive heart failure (please specify if POA)  ·         Acute on chronic systolic congestive heart failure  (Please specify if POA)  ·         Other explanations of clinical findings (please document)  ·         Unable to determine           The medical record reflects the following:   Clinical Findings H&P: Temp (24hrs), Av.8 °C (98.2 °F), Min:36.8 °C (98.2 °F), Max:36.8 °C (98.2 °F)  Temperature: 36.8 °C (98.2 °F)  Pulse Av.5 Min: 95 Max: 98 Heart Rate (Monitored): 96  Blood Pressure: (!) 175/97, NIBP: 160/84   Respiration: 18, Pulse Oximetry: 96 %   WBC: 7.9  Lactic acid 2.0  Troponin 0.06     Discharge Summary:  ECHO was performed that showed mild grade II diastolic dysfunction along with an EF of 35-40% showing mild impairment of the left ventricle.   Treatment Steroids, antibiotics, oxygen, mild diuretics   Risk Factors Acute respiratory failure with hypoxia, diastolic heart failure, COPD exacerbation    Location within medical record H&P, Discharge Summary      Thank you,   Alisa Antonio, CCS, CCS-P  [email or web address]